# Patient Record
Sex: FEMALE | Race: WHITE | Employment: PART TIME | URBAN - METROPOLITAN AREA
[De-identification: names, ages, dates, MRNs, and addresses within clinical notes are randomized per-mention and may not be internally consistent; named-entity substitution may affect disease eponyms.]

---

## 2020-01-16 ENCOUNTER — TRANSFERRED RECORDS (OUTPATIENT)
Dept: HEALTH INFORMATION MANAGEMENT | Facility: CLINIC | Age: 25
End: 2020-01-16

## 2020-01-28 ENCOUNTER — MEDICAL CORRESPONDENCE (OUTPATIENT)
Dept: HEALTH INFORMATION MANAGEMENT | Facility: CLINIC | Age: 25
End: 2020-01-28

## 2020-01-31 ENCOUNTER — TELEPHONE (OUTPATIENT)
Dept: MATERNAL FETAL MEDICINE | Facility: CLINIC | Age: 25
End: 2020-01-31

## 2020-01-31 NOTE — TELEPHONE ENCOUNTER
Phone call to Nelida regarding referral from [facility] due to pregnancy c/b fetal heart defect. Offered visit with MFM and Peds cards at [facility] on 2/14 or 2/26. Pt declined due to work and school conflicts. Would prefer to have appt on 2/21. Writer will contact peds cards to discuss options.    Victoria Love RN

## 2020-02-03 ENCOUNTER — TELEPHONE (OUTPATIENT)
Dept: MATERNAL FETAL MEDICINE | Facility: CLINIC | Age: 25
End: 2020-02-03

## 2020-02-03 DIAGNOSIS — O35.BXX0 ANOMALY OF HEART OF FETUS AFFECTING PREGNANCY, ANTEPARTUM, SINGLE OR UNSPECIFIED FETUS: Primary | ICD-10-CM

## 2020-02-03 NOTE — TELEPHONE ENCOUNTER
Phone call to Kelsey to confirm appointment on 2/21 beginning at 1100 with L2 at MFM followed by fetal echo at 2pm at [facility]. Writer will email patient information on appts and location.    Victoria Love RN

## 2020-02-19 ENCOUNTER — PRE VISIT (OUTPATIENT)
Dept: MATERNAL FETAL MEDICINE | Facility: CLINIC | Age: 25
End: 2020-02-19

## 2020-02-21 ENCOUNTER — OFFICE VISIT (OUTPATIENT)
Dept: MATERNAL FETAL MEDICINE | Facility: CLINIC | Age: 25
End: 2020-02-21
Attending: OBSTETRICS & GYNECOLOGY
Payer: COMMERCIAL

## 2020-02-21 ENCOUNTER — HOSPITAL ENCOUNTER (OUTPATIENT)
Dept: ULTRASOUND IMAGING | Facility: CLINIC | Age: 25
Discharge: HOME | End: 2020-02-21
Attending: OBSTETRICS & GYNECOLOGY | Admitting: OBSTETRICS & GYNECOLOGY
Payer: COMMERCIAL

## 2020-02-21 ENCOUNTER — HOSPITAL ENCOUNTER (OUTPATIENT)
Dept: CARDIOLOGY | Facility: CLINIC | Age: 25
End: 2020-02-21
Payer: COMMERCIAL

## 2020-02-21 DIAGNOSIS — O40.9XX0 POLYHYDRAMNIOS, ANTEPARTUM, SINGLE OR UNSPECIFIED FETUS: ICD-10-CM

## 2020-02-21 DIAGNOSIS — O35.BXX0 ANOMALY OF HEART OF FETUS AFFECTING PREGNANCY, ANTEPARTUM, SINGLE OR UNSPECIFIED FETUS: ICD-10-CM

## 2020-02-21 DIAGNOSIS — O24.019 PRE-EXISTING TYPE 1 DIABETES MELLITUS DURING PREGNANCY, ANTEPARTUM: ICD-10-CM

## 2020-02-21 DIAGNOSIS — O35.09X0 PREGNANCY COMPLICATED BY FETAL CEREBRAL VENTRICULOMEGALY, SINGLE OR UNSPECIFIED FETUS: Primary | ICD-10-CM

## 2020-02-21 DIAGNOSIS — O26.90 PREGNANCY RELATED CONDITION, ANTEPARTUM: ICD-10-CM

## 2020-02-21 PROCEDURE — 76827 ECHO EXAM OF FETAL HEART: CPT

## 2020-02-21 PROCEDURE — 76811 OB US DETAILED SNGL FETUS: CPT

## 2020-02-21 NOTE — PROGRESS NOTES
[facility]   Heart Center Fetal Consult Note    Patient:  Kelsey Azar MRN:  [id number]   YOB: 1995 Age:  24 year old   Date of Visit:  2020 PCP:  No Ref-Primary, Physician     Dear Dr. Magana:    I had the pleasure of seeing Kelsey Azar at the [facility] on 2020 in [facility] children's fetal cardiology consultation for fetal echocardiogram. She presented today accompanied by her boyfriend, mother, and aunt. As you know, she is a 24 year old  at 27w6d who presented for fetal echocardiogram today because of suspected truncus arteriosus.    I performed and interpreted the fetal echocardiogram today, which demonstrated large ventricular septal defect. Single outlet was seen giving rise to head and neck vessles. The single outlet's valve was thickened and doming with no evidence of stenosis or insufficiency. The branch pulmonary arteries are normal in size; however, it was difficult to delinate the origin. No ductus arteriosus was identified. No obvious major aortopulmonary collaterals were seen arising from the descending aorta. Normal right and left ventricular size and systolic function. No evidence of hydrops. 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 Difficult study due to fetal position and poor imaging windows.    The family wanted very limited counseling today. With the help of a diagram, I reviewed that we were not able to delineate the branch pulmonary artery origins on this fetal echocardiogram with Kelsey Azar and her boyfriend and family. Kelsey did not want to review the physiology or plans for intervention. She would like to defer discussion with the cardiothoracic surgeon (Dr. Marie). They inquired if they chose comfort care; how long the baby would live for. I explained that it is difficult to determine  this since we are unable to see the pulmonary artery origins. The [facility] team have discussed options with the family, and currently, the Kelsey and her family are reviewing all options. I provided her with my direct contact information for additional questions after they left.   If they decide to continue with the pregnancy and further intervention, I would recommend follow up in 3 weeks in [place], [place] to assess the thickened valve (for stenosis and insufficiency), and then again at 34-35 weeks if they relocate.     Thank you for allowing me to participate in Kelsey's care. Please do not hesitate to contact me with questions or concerns.    This visit was separate from the performance and interpretation of the ultrasound. The majority of the time (>50%) was spent in counseling and coordination of care. I spent approximately 20 minutes in face-to-face time reviewing the above considerations.    Bonnie Heredia M.D.  Pediatric Cardiology  [facility] [address], 5th floor, [facility] [address]  Phone [phone]  Fax [phone]

## 2020-02-24 ENCOUNTER — TELEPHONE (OUTPATIENT)
Dept: MATERNAL FETAL MEDICINE | Facility: CLINIC | Age: 25
End: 2020-02-24

## 2020-02-24 NOTE — PROGRESS NOTES
Please see ultrasound report under imaging tab for details on ultrasound performed today.    Victoria Morales MD  , OB/GYN  Maternal-Fetal Medicine  [email or web address]  [phone] (Academic office)  [phone] (Pager)

## 2020-02-24 NOTE — TELEPHONE ENCOUNTER
24 year old   at 28w2d    Seen at [facility] for multiple fetal anomalies includin. Severe bilateral ventriculomegaly  2. Fetal congenital heart disease  3. Abnormal fetal spine/ribs/feet    As requested by Kelsey I called the [place]  clinic and spoke directly to Dr. Anthony about her, including the BPD/HC, breech, and maternal diabetes.  He is happy to help take care of her.      I called Kelsey but there was no answer.  Message left for her to call me back to discuss.    Also called Dr. Magana to give him an update on Kelsey's plan of care.  Asked him to please get an update HgbA1C to help assess her current level of control.     Plan is awaiting Kelsey's return phone call.  If she does not pursue out of state termination we will plan to see her back in early April and Dr. Magana will see her in early-mid March.            Victoria Morales MD  , OB/GYN  Maternal-Fetal Medicine  [email or web address]  [phone] (Academic office)  [phone] (Pager)

## 2020-02-24 NOTE — TELEPHONE ENCOUNTER
Spoke to Julianan, genetic counselor at  regarding potential leftover specimen from Kelsey's amniocentesis procedure completed in December. Julianna will contact [facility] to determine if there is any remaining DNA that could be used for further testing.    Per Dr. Victoria Morales's request, I spoke to the [facility] to see if Kelsey was a candidate for pregnancy termination with them. Ultrasound measurements were given and Dr. Morales discussed the case in greater detail with Dr. Reynolds. Dr. Reynolds will determine if it is most appropriate to complete a D&E procedure or a cardioplegia procedure followed by induction of labor either at [facility] or at [facility].    We will await call back from Kelsey to determine desired next steps.    Tanisha De La Cruz MS, [facility]  Maternal Fetal Medicine  [facility]  Ph: [phone]  [email or web address]

## 2020-02-25 ENCOUNTER — TELEPHONE (OUTPATIENT)
Dept: MATERNAL FETAL MEDICINE | Facility: CLINIC | Age: 25
End: 2020-02-25

## 2020-02-25 DIAGNOSIS — O35.09X0 PREGNANCY COMPLICATED BY FETAL CEREBRAL VENTRICULOMEGALY, SINGLE OR UNSPECIFIED FETUS: Primary | ICD-10-CM

## 2020-02-25 DIAGNOSIS — O35.BXX0 ANOMALY OF HEART OF FETUS AFFECTING PREGNANCY, ANTEPARTUM, SINGLE OR UNSPECIFIED FETUS: ICD-10-CM

## 2020-02-25 DIAGNOSIS — O40.9XX0 POLYHYDRAMNIOS, ANTEPARTUM, SINGLE OR UNSPECIFIED FETUS: ICD-10-CM

## 2020-02-25 NOTE — TELEPHONE ENCOUNTER
Spoke with Kelsey this morning, attempted to discuss patient wishes moving forward. Kelsye would like to wait to discuss any options until she is with her Boyfriend. She would like to make a list of questions and be prepared for the conversation. Writer DARRON. Encouraged pt to call at any time with questions or concerns and confirmed she has PCC # to call. Writer explained to pt we will help her move forward with any decision she makes. Pt VU. PT has No further questions at this time and states she will call PCC back when she is prepared to discuss options.  Luzmaria Oh RN

## 2020-02-25 NOTE — TELEPHONE ENCOUNTER
24 year old   at 28w3d    Seen at [facility] for multiple fetal anomalies includin. Severe bilateral ventriculomegaly  2. Fetal congenital heart disease  3. Abnormal fetal spine/ribs/feet    Kelsey returned my phone call and I relayed to her the information I received from Dr. Anthony at the [place]  clinic.  Discussed that he proposes to perform a cardioplegia and then likely send her back for an induction of labor here given her pre-gestational diabetes.  She has received a phone call from Dr. Magana's office to get a hemoglobin A1C done.  This information could be shared with Dr. Anthony and, depending on the level of control, he can decide if he feels comfortable completing the procedure there.  Otherwise, Kelsey understands that she would have to return and undergo IOL here.  We discussed the likely need for cranial decompression either prior to initiating IOL (cephalocentesis) and/or at the time of delivery with a skilled provider, and I have already spoken to Dr. Pelaez about this case.  Kelsey understands that although she would re-present to us after induced fetal demise, we cannot make any guarantees as to her insurance coverage for these services and as such our office will initiate an insurance investigation.      At her request, she was given the phone number to the [facility], she knows she has to call herself to make an appointment and after she has an appointment we will share her records with them.  I would be available to speak with Dr. Anthony again, as needed.   She understands they will speak directly to her about finances as well.      Kelsey then asked to review the ultrasound findings again, specifically the brain and spine findings, which we did.  She and her boyfriend Fausto, asked questions about  and classical  and the management of the pregnancy.  We reviewed that, in the absence of a lethal fetal condition, or a pregnancy complication (such as PEC or  PPROM) we could not undertake delivery early in the hopes of avoiding a classical .  They also asked some questions about post-ambar management and we reviewed the uncertainity of the prognosis given the ultrasound findings and the possible spectrum of outcomes.      At the end of the conversation Kelsey indicated that all of her questions were answered and she will call us back once she has spoken to the [facility] clinic.    After speaking with Kelsey I also did discuss this case with Dr. Donald from neonatology.  Normally they do in person consultations, but given the time and geographical constraints for Kelsey Donald was willing to speak with her by phone.  I left a message for Kelsey indicating this option, if she thinks it would be helpful for her in making this decision.      Victoria Morales MD  , OB/GYN  Maternal-Fetal Medicine  [email or web address]  [phone] (Academic office)  [phone] (Pager)

## 2020-02-26 ENCOUNTER — TELEPHONE (OUTPATIENT)
Dept: MATERNAL FETAL MEDICINE | Facility: CLINIC | Age: 25
End: 2020-02-26

## 2020-02-26 NOTE — TELEPHONE ENCOUNTER
24 year old   at 28w4d    Seen at [facility] for multiple fetal anomalies includin. Severe bilateral ventriculomegaly  2. Fetal congenital heart disease  3. Abnormal fetal spine/ribs/feet    Kelsey returned my phone call.  At this time she does not think that speaking with neonatology will be helpful.  She has already contacted the [facility] and is in the middle of financial discussions with them.              Victoria Morales MD  , OB/GYN  Maternal-Fetal Medicine  [email or web address]  [phone] (Academic office)  [phone] (Pager)

## 2020-02-28 ENCOUNTER — TELEPHONE (OUTPATIENT)
Dept: MATERNAL FETAL MEDICINE | Facility: CLINIC | Age: 25
End: 2020-02-28

## 2020-02-29 NOTE — TELEPHONE ENCOUNTER
24 year old   at 28w6d    Seen at [facility] for multiple fetal anomalies includin. Severe bilateral ventriculomegaly  2. Fetal congenital heart disease  3. Abnormal fetal spine/ribs/feet    Called Kelsey to see if she had an updates with a plan to go to [place] next week as we need to make arrangements for further care after.  She has not called them back from two days ago as she is awaiting some financial information from her mother.  I also let her know that we were in touch with the [place] clinic who quoted a 5,000 versus 10,000 fee for cardioplegia and Kelsey was given that phone number.  She was asked to please let us know as soon as arrangements were made so that we could send records.      She asked if I would speak to her mother Sheyla, also, who had some questions.  With her permission I also spoke to Sheyla.  Sheyla reports that their family/community are in the process of trying to raise funds.  She asked a few questions about the ultrasound findings, which we reviewed, including the uncertainty about the prognosis.  Sheyla asked again about the option of not pursuing  interventions at the time of birth.  I reviewed that the findings were not immediately lethal and that for a further discussion of what might be appropriate to withhold I recommend speaking with neonatology.  I informed Sheyla that Dr. Donald was willing to speak with the family, but that Kelsey had declined at this time and Sheyla notes that she supports Kelsey's decisions.  She is happy to hear they may have another option for the cardioplegia, which is less expensive.  We again reviewed that we cannot control the insurance coverage for services after the cardioplegia if the insurance company likes the out of state services to the services rendered at [facility].  Both Kelsey and Sheyla express understanding of that and have asked us not to reach out to the insurance company based on the preliminary  information they have received.    I have asked Kelsey and/or Sheyla to follow-up with us after the weekend, especially if a plan is in place for cardioplegia as we will have to make arrangements for the induction of labor.  We discussed that, ideally, we would be able to perform a cephalocentesis under anesthesia with amniocentesis to pursue further genetic testing (hydrocephalus panel, VACTERL-H testing, possible whole exom sequencing), if possible prior to starting the induction of labor.  There will absolutely need to be cranial decompression to undertake delivery and they are aware that we need time to plan this.          Victoria Morales MD  , OB/GYN  Maternal-Fetal Medicine  [email or web address]  [phone] (Academic office)  [phone] (Pager)

## 2020-03-02 ENCOUNTER — TELEPHONE (OUTPATIENT)
Dept: MATERNAL FETAL MEDICINE | Facility: CLINIC | Age: 25
End: 2020-03-02

## 2020-03-02 NOTE — TELEPHONE ENCOUNTER
ZOEY for Kelsey that we are working on her plan to set up her IOL on Thursday, Dr. Morales will call her tomorrow with details but plan to present to L&D by 0800. Instructed Kelsey to call [facility] # with any questions.  Luzmaria Oh RN

## 2020-03-02 NOTE — TELEPHONE ENCOUNTER
Per Rosy at Dr. Anthony's office, Kelsey will be scheduled for her procedure with Dr. Anthony on Wednesday, 3/4 and will be returning to [facility] on Thursday, 3/5 for amniocentesis and induction of labor.    Pertinent medical records have been sent to Dr. Anthony's office per their request.    Tanisha De La Cruz MS, [facility]  Maternal Fetal Medicine  [facility]  Ph: [phone]  [email or web address]

## 2020-03-03 ENCOUNTER — TELEPHONE (OUTPATIENT)
Dept: MATERNAL FETAL MEDICINE | Facility: CLINIC | Age: 25
End: 2020-03-03

## 2020-03-03 NOTE — TELEPHONE ENCOUNTER
Email with address to [facility] sent to Kelsey with arrival time at 0630 on Thursday 3/5.      Victoria Love RN

## 2020-03-03 NOTE — TELEPHONE ENCOUNTER
24 year old   at 29w3d    Seen at [facility] for multiple fetal anomalies includin. Severe bilateral ventriculomegaly  2. Fetal congenital heart disease  3. Abnormal fetal spine/ribs/feet     Kelsey is at the airport right now waiting for her flight to [place].      I spoke to Dr. Anthony and Dr. Main at the [place]  clinic; they do not perform ECV.      Kelsey is flying back to [place] tomorrow night and is staying with her aunt.    Reviewed with her the [facility]2020 to get admitted.  Directions sent to her e-mail for where to present that morning.  Will plan for mifepristone on admission.  Reviewed the plan for amniocentesis and hopefully cephalocentesis prior to starting IOL.  Discussed plan for IOL including the process of cervical ripening followed by oxytocin.  Discussed possibility of slow delivery of head/head entrapment with the possible need for further cranial decompression.  Discussed the last resort of Duhrssen incisions.  Discussed that we would evaluate everything Thursday morning and make the final plan at that time.      Kelsey had no questions.        Victoria Morales MD  , OB/GYN  Maternal-Fetal Medicine  [email or web address]  [phone] (Academic office)  [phone] (Pager)

## 2020-03-04 ENCOUNTER — DOCUMENTATION ONLY (OUTPATIENT)
Dept: MATERNAL FETAL MEDICINE | Facility: CLINIC | Age: 25
End: 2020-03-04

## 2020-03-04 NOTE — PROGRESS NOTES
Per Dr. Victoria Morales's recommendation, out-of-pocket cost for the Hydrocephalus Panel via [employer] [facility] is being assessed. Per [employer], assistance funds may be available to put towards the cost of Kelsey's testing. This possibility is being assessed by [employer] today and and we will be informed of any discount available. A benefits investigation was also submitted to [employer] today to clarify maximum out-of-pocket cost for this testing.  The Hydrocephalus Panel is test code 7939 and cell culture is test code 995.    A second option for testing would be Targeted Prenatal Exome via [facility] laboratory. Per [facility], extracted DNA would be necessary to perform this testing as well as blood specimens from both mom and dad to complete a trio analysis. The [facility] laboratory has been contacted to determine if DNA extraction from an amniocentesis specimen would be possible.    A genetic counselor will consult Kelsey during her induction tomorrow to complete necessary consents and discuss this testing, cost, and possible turn around time.    Tanisha De La Cruz MS, [facility]  Maternal Fetal Medicine  [facility]  Ph: [phone]  [email or web address]

## 2020-03-05 ENCOUNTER — ANESTHESIA (OUTPATIENT)
Dept: OBGYN | Facility: CLINIC | Age: 25
End: 2020-03-05
Payer: COMMERCIAL

## 2020-03-05 ENCOUNTER — HOSPITAL ENCOUNTER (INPATIENT)
Facility: CLINIC | Age: 25
LOS: 4 days | Discharge: HOME | End: 2020-03-09
Attending: OBSTETRICS & GYNECOLOGY | Admitting: OBSTETRICS & GYNECOLOGY
Payer: COMMERCIAL

## 2020-03-05 ENCOUNTER — ANESTHESIA EVENT (OUTPATIENT)
Dept: OBGYN | Facility: CLINIC | Age: 25
End: 2020-03-05
Payer: COMMERCIAL

## 2020-03-05 ENCOUNTER — HOSPITAL ENCOUNTER (INPATIENT)
Dept: ULTRASOUND IMAGING | Facility: CLINIC | Age: 25
End: 2020-03-05
Attending: OBSTETRICS & GYNECOLOGY
Payer: COMMERCIAL

## 2020-03-05 ENCOUNTER — TRANSFERRED RECORDS (OUTPATIENT)
Dept: HEALTH INFORMATION MANAGEMENT | Facility: CLINIC | Age: 25
End: 2020-03-05

## 2020-03-05 PROBLEM — O36.4XX0 FETAL DEMISE, GREATER THAN 22 WEEKS, ANTEPARTUM: Status: ACTIVE | Noted: 2020-03-05

## 2020-03-05 LAB
ABO + RH BLD: NORMAL
ABO + RH BLD: NORMAL
BASOPHILS # BLD AUTO: 0 10E9/L (ref 0–0.2)
BASOPHILS NFR BLD AUTO: 0.2 %
BLD GP AB SCN SERPL QL: NORMAL
BLOOD BANK CMNT PATIENT-IMP: NORMAL
DIFFERENTIAL METHOD BLD: ABNORMAL
EOSINOPHIL # BLD AUTO: 0 10E9/L (ref 0–0.7)
EOSINOPHIL NFR BLD AUTO: 0.3 %
ERYTHROCYTE [DISTWIDTH] IN BLOOD BY AUTOMATED COUNT: 12.5 % (ref 10–15)
GLUCOSE BLDC GLUCOMTR-MCNC: 101 MG/DL (ref 70–99)
GLUCOSE BLDC GLUCOMTR-MCNC: 122 MG/DL (ref 70–99)
GLUCOSE BLDC GLUCOMTR-MCNC: 189 MG/DL (ref 70–99)
GLUCOSE BLDC GLUCOMTR-MCNC: 198 MG/DL (ref 70–99)
GLUCOSE BLDC GLUCOMTR-MCNC: 236 MG/DL (ref 70–99)
GLUCOSE BLDC GLUCOMTR-MCNC: 272 MG/DL (ref 70–99)
GLUCOSE BLDC GLUCOMTR-MCNC: 304 MG/DL (ref 70–99)
GLUCOSE BLDC GLUCOMTR-MCNC: 342 MG/DL (ref 70–99)
HCT VFR BLD AUTO: 36.7 % (ref 35–47)
HGB BLD-MCNC: 12.5 G/DL (ref 11.7–15.7)
HIV1+2 AB SPEC QL IA.RAPID: NONREACTIVE
IMM GRANULOCYTES # BLD: 0 10E9/L (ref 0–0.4)
IMM GRANULOCYTES NFR BLD: 0.3 %
LYMPHOCYTES # BLD AUTO: 1.4 10E9/L (ref 0.8–5.3)
LYMPHOCYTES NFR BLD AUTO: 10.4 %
MCH RBC QN AUTO: 31.9 PG (ref 26.5–33)
MCHC RBC AUTO-ENTMCNC: 34.1 G/DL (ref 31.5–36.5)
MCV RBC AUTO: 94 FL (ref 78–100)
MISCELLANEOUS TEST: NORMAL
MONOCYTES # BLD AUTO: 0.5 10E9/L (ref 0–1.3)
MONOCYTES NFR BLD AUTO: 4 %
NEUTROPHILS # BLD AUTO: 11.3 10E9/L (ref 1.6–8.3)
NEUTROPHILS NFR BLD AUTO: 84.8 %
NRBC # BLD AUTO: 0 10*3/UL
NRBC BLD AUTO-RTO: 0 /100
PLATELET # BLD AUTO: 236 10E9/L (ref 150–450)
RBC # BLD AUTO: 3.92 10E12/L (ref 3.8–5.2)
SPECIMEN EXP DATE BLD: NORMAL
WBC # BLD AUTO: 13.3 10E9/L (ref 4–11)

## 2020-03-05 PROCEDURE — 00000146 ZZHCL STATISTIC GLUCOSE BY METER IP

## 2020-03-05 PROCEDURE — 25000132 ZZH RX MED GY IP 250 OP 250 PS 637: Performed by: STUDENT IN AN ORGANIZED HEALTH CARE EDUCATION/TRAINING PROGRAM

## 2020-03-05 PROCEDURE — 3E0P7VZ INTRODUCTION OF HORMONE INTO FEMALE REPRODUCTIVE, VIA NATURAL OR ARTIFICIAL OPENING: ICD-10-PCS | Performed by: OBSTETRICS & GYNECOLOGY

## 2020-03-05 PROCEDURE — 81321 PTEN GENE FULL SEQUENCE: CPT | Performed by: OBSTETRICS & GYNECOLOGY

## 2020-03-05 PROCEDURE — 12000001 ZZH R&B MED SURG/OB UMMC

## 2020-03-05 PROCEDURE — 36415 COLL VENOUS BLD VENIPUNCTURE: CPT | Performed by: OBSTETRICS & GYNECOLOGY

## 2020-03-05 PROCEDURE — 25000125 ZZHC RX 250: Performed by: OBSTETRICS & GYNECOLOGY

## 2020-03-05 PROCEDURE — 86703 HIV-1/HIV-2 1 RESULT ANTBDY: CPT | Performed by: OBSTETRICS & GYNECOLOGY

## 2020-03-05 PROCEDURE — 85025 COMPLETE CBC W/AUTO DIFF WBC: CPT | Performed by: STUDENT IN AN ORGANIZED HEALTH CARE EDUCATION/TRAINING PROGRAM

## 2020-03-05 PROCEDURE — 3E033VJ INTRODUCTION OF OTHER HORMONE INTO PERIPHERAL VEIN, PERCUTANEOUS APPROACH: ICD-10-PCS | Performed by: OBSTETRICS & GYNECOLOGY

## 2020-03-05 PROCEDURE — 86901 BLOOD TYPING SEROLOGIC RH(D): CPT | Performed by: STUDENT IN AN ORGANIZED HEALTH CARE EDUCATION/TRAINING PROGRAM

## 2020-03-05 PROCEDURE — 25800030 ZZH RX IP 258 OP 636: Performed by: STUDENT IN AN ORGANIZED HEALTH CARE EDUCATION/TRAINING PROGRAM

## 2020-03-05 PROCEDURE — 10903ZU DRAINAGE OF AMNIOTIC FLUID, DIAGNOSTIC FROM PRODUCTS OF CONCEPTION, PERCUTANEOUS APPROACH: ICD-10-PCS | Performed by: OBSTETRICS & GYNECOLOGY

## 2020-03-05 PROCEDURE — 81407 MOPATH PROCEDURE LEVEL 8: CPT | Performed by: OBSTETRICS & GYNECOLOGY

## 2020-03-05 PROCEDURE — 40001082 ZZHCL STATISTIC MATERNAL CELL CONTAM MOLEC: Performed by: OBSTETRICS & GYNECOLOGY

## 2020-03-05 PROCEDURE — 81479 UNLISTED MOLECULAR PATHOLOGY: CPT | Performed by: OBSTETRICS & GYNECOLOGY

## 2020-03-05 PROCEDURE — 81406 MOPATH PROCEDURE LEVEL 7: CPT | Performed by: OBSTETRICS & GYNECOLOGY

## 2020-03-05 PROCEDURE — 59000 AMNIOCENTESIS DIAGNOSTIC: CPT

## 2020-03-05 PROCEDURE — 84999 UNLISTED CHEMISTRY PROCEDURE: CPT | Performed by: OBSTETRICS & GYNECOLOGY

## 2020-03-05 PROCEDURE — 86803 HEPATITIS C AB TEST: CPT | Performed by: OBSTETRICS & GYNECOLOGY

## 2020-03-05 PROCEDURE — 86900 BLOOD TYPING SEROLOGIC ABO: CPT | Performed by: STUDENT IN AN ORGANIZED HEALTH CARE EDUCATION/TRAINING PROGRAM

## 2020-03-05 PROCEDURE — 25000131 ZZH RX MED GY IP 250 OP 636 PS 637: Performed by: PHYSICIAN ASSISTANT

## 2020-03-05 PROCEDURE — 25000131 ZZH RX MED GY IP 250 OP 636 PS 637: Performed by: STUDENT IN AN ORGANIZED HEALTH CARE EDUCATION/TRAINING PROGRAM

## 2020-03-05 PROCEDURE — 86850 RBC ANTIBODY SCREEN: CPT | Performed by: STUDENT IN AN ORGANIZED HEALTH CARE EDUCATION/TRAINING PROGRAM

## 2020-03-05 PROCEDURE — 81323 PTEN GENE DUP/DELET VARIANT: CPT | Performed by: OBSTETRICS & GYNECOLOGY

## 2020-03-05 RX ORDER — DIPHENOXYLATE HCL/ATROPINE 2.5-.025MG
2 TABLET ORAL ONCE
Status: DISCONTINUED | OUTPATIENT
Start: 2020-03-05 | End: 2020-03-08

## 2020-03-05 RX ORDER — MISOPROSTOL 100 UG/1
25 TABLET ORAL EVERY 4 HOURS PRN
Status: COMPLETED | OUTPATIENT
Start: 2020-03-05 | End: 2020-03-06

## 2020-03-05 RX ORDER — ACETAMINOPHEN 325 MG/1
975 TABLET ORAL EVERY 4 HOURS PRN
Status: DISCONTINUED | OUTPATIENT
Start: 2020-03-05 | End: 2020-03-08

## 2020-03-05 RX ORDER — ACETAMINOPHEN 325 MG/1
650 TABLET ORAL EVERY 4 HOURS PRN
Status: DISCONTINUED | OUTPATIENT
Start: 2020-03-05 | End: 2020-03-08

## 2020-03-05 RX ORDER — LIDOCAINE HYDROCHLORIDE 10 MG/ML
10 INJECTION, SOLUTION EPIDURAL; INFILTRATION; INTRACAUDAL; PERINEURAL ONCE
Status: COMPLETED | OUTPATIENT
Start: 2020-03-05 | End: 2020-03-05

## 2020-03-05 RX ORDER — ONDANSETRON 2 MG/ML
4 INJECTION INTRAMUSCULAR; INTRAVENOUS EVERY 6 HOURS PRN
Status: DISCONTINUED | OUTPATIENT
Start: 2020-03-05 | End: 2020-03-08

## 2020-03-05 RX ORDER — DEXTROSE MONOHYDRATE 25 G/50ML
25-50 INJECTION, SOLUTION INTRAVENOUS
Status: DISCONTINUED | OUTPATIENT
Start: 2020-03-05 | End: 2020-03-09 | Stop reason: HOSPADM

## 2020-03-05 RX ORDER — NICOTINE POLACRILEX 4 MG
15-30 LOZENGE BUCCAL
Status: DISCONTINUED | OUTPATIENT
Start: 2020-03-05 | End: 2020-03-09 | Stop reason: HOSPADM

## 2020-03-05 RX ORDER — MIFEPRISTONE 200 MG/1
200 TABLET ORAL ONCE
Status: COMPLETED | OUTPATIENT
Start: 2020-03-05 | End: 2020-03-05

## 2020-03-05 RX ORDER — TERBUTALINE SULFATE 1 MG/ML
0.25 INJECTION, SOLUTION SUBCUTANEOUS
Status: DISCONTINUED | OUTPATIENT
Start: 2020-03-05 | End: 2020-03-08

## 2020-03-05 RX ORDER — DIPHENHYDRAMINE HYDROCHLORIDE 50 MG/ML
25 INJECTION INTRAMUSCULAR; INTRAVENOUS EVERY 6 HOURS PRN
Status: DISCONTINUED | OUTPATIENT
Start: 2020-03-05 | End: 2020-03-08

## 2020-03-05 RX ORDER — DIPHENHYDRAMINE HCL 25 MG
25 CAPSULE ORAL EVERY 6 HOURS PRN
Status: DISCONTINUED | OUTPATIENT
Start: 2020-03-05 | End: 2020-03-08

## 2020-03-05 RX ORDER — ASPIRIN 81 MG/1
81 TABLET, CHEWABLE ORAL DAILY
COMMUNITY

## 2020-03-05 RX ORDER — SODIUM CHLORIDE, SODIUM LACTATE, POTASSIUM CHLORIDE, CALCIUM CHLORIDE 600; 310; 30; 20 MG/100ML; MG/100ML; MG/100ML; MG/100ML
INJECTION, SOLUTION INTRAVENOUS CONTINUOUS
Status: DISCONTINUED | OUTPATIENT
Start: 2020-03-05 | End: 2020-03-08

## 2020-03-05 RX ORDER — LIDOCAINE 40 MG/G
CREAM TOPICAL
Status: DISCONTINUED | OUTPATIENT
Start: 2020-03-05 | End: 2020-03-08

## 2020-03-05 RX ORDER — CHLORAL HYDRATE 500 MG
2 CAPSULE ORAL DAILY
COMMUNITY

## 2020-03-05 RX ORDER — DIPHENOXYLATE HCL/ATROPINE 2.5-.025MG
2 TABLET ORAL EVERY 4 HOURS PRN
Status: DISCONTINUED | OUTPATIENT
Start: 2020-03-05 | End: 2020-03-08

## 2020-03-05 RX ORDER — INSULIN LISPRO 100 [IU]/ML
INJECTION, SOLUTION INTRAVENOUS; SUBCUTANEOUS
COMMUNITY

## 2020-03-05 RX ADMIN — SODIUM CHLORIDE, POTASSIUM CHLORIDE, SODIUM LACTATE AND CALCIUM CHLORIDE: 600; 310; 30; 20 INJECTION, SOLUTION INTRAVENOUS at 01:31

## 2020-03-05 RX ADMIN — Medication 25 MCG: at 21:00

## 2020-03-05 RX ADMIN — INSULIN DETEMIR 24 UNITS: 100 INJECTION, SOLUTION SUBCUTANEOUS at 22:10

## 2020-03-05 RX ADMIN — Medication 25 MCG: at 13:06

## 2020-03-05 RX ADMIN — Medication 200 MG: at 14:42

## 2020-03-05 RX ADMIN — ACETAMINOPHEN 975 MG: 325 TABLET, FILM COATED ORAL at 09:42

## 2020-03-05 RX ADMIN — Medication 25 MCG: at 16:52

## 2020-03-05 RX ADMIN — LIDOCAINE HYDROCHLORIDE 2 ML: 10 INJECTION, SOLUTION EPIDURAL; INFILTRATION; INTRACAUDAL; PERINEURAL at 07:40

## 2020-03-05 RX ADMIN — DIPHENHYDRAMINE HYDROCHLORIDE 25 MG: 25 CAPSULE ORAL at 01:32

## 2020-03-05 RX ADMIN — DIPHENHYDRAMINE HYDROCHLORIDE 25 MG: 25 CAPSULE ORAL at 22:10

## 2020-03-05 RX ADMIN — SODIUM CHLORIDE, POTASSIUM CHLORIDE, SODIUM LACTATE AND CALCIUM CHLORIDE 1000 ML: 600; 310; 30; 20 INJECTION, SOLUTION INTRAVENOUS at 09:44

## 2020-03-05 RX ADMIN — INSULIN ASPART 2 UNITS: 100 INJECTION, SOLUTION INTRAVENOUS; SUBCUTANEOUS at 03:36

## 2020-03-05 SDOH — HEALTH STABILITY: MENTAL HEALTH: HOW OFTEN DO YOU HAVE A DRINK CONTAINING ALCOHOL?: NEVER

## 2020-03-05 ASSESSMENT — MIFFLIN-ST. JEOR: SCORE: 1565.54

## 2020-03-05 NOTE — PLAN OF CARE
Pt arrived to unit approximately at 0030 with her boyfriend and mother. Pt being admitted due to induced fetal demise for multiple fetal anomalies. Pt was in [place] for administration of cardioplegia and just landed around 2300 prior to coming to hospital. Pt stated while in [place] she was experiencing a low grade fever of approx 101, was jessie and had blood in her urine. Upon arrival pt did not have a fever, denies any chills, blood in urine, loss of fluid or vaginal bleeding. Pt does report mild cramping, but is coping well. She is very exhausted and is hoping to get some rest prior to procedure in AM. Pt was given benadryl PO to help her sleep. Plan is for pt to rest, remain NPO, IV fluids infusing at 125 mL/hr and to continue to monitor pts blood sugar closely. All questions and concerns have currently been addressed. Will continue to monitor pt.

## 2020-03-05 NOTE — PROVIDER NOTIFICATION
03/05/20 0055   Provider Notification   Provider Name/Title Dr. Samuels   Method of Notification At Bedside   Notification Reason Patient Arrived;Status Update   Provider at bedside introducing self to pt. Plan is to collect blood for lab work, start an IV and infuse fluids at 125 mL/hr. Pt to remain NPO until procedure in AM. Provider also ordered SVDs due to pt recent travel on plane and bilateral leg swelling. All of patient and patient's family questions and concerns addressed.

## 2020-03-05 NOTE — PROVIDER NOTIFICATION
03/05/20 0331   Provider Notification   Provider Name/Title Dr. Samuels   Method of Notification Phone   Notification Reason Other (Comment)  (high blood sugar)   Spoke with provider on the phone of pts blood sugar of 236. Plan is to give 2 units subcutaneous of NovoLog based off of the sliding scale. Per provider, recheck blood sugar before pt eats breakfast after procedure.

## 2020-03-05 NOTE — PROVIDER NOTIFICATION
03/05/20 0120   Provider Notification   Provider Name/Title Dr. Samuels   Method of Notification Phone   Notification Reason Other (Comment)  (high blood sugar)   Provider notified of pts high blood sugar of 342. Pt stated that she feels that when she was in the hospital in [place] that they were not covering her carbs correctly. Pt stated she took her blood sugar prior to arrival and corrected her high blood sugar with her short acting. Plan is to reassess blood sugar at around 0330, if need be, there is an order set in for insulin. Not correcting high blood sugar at this time, so high blood sugar is not over corrected. Will continue to monitor pt closely.

## 2020-03-05 NOTE — PLAN OF CARE
Pt had amnio and cephalocentesis this morning. 1200ml of fluid drained from head. Pt tolerated procedure ok. Pt c/o discomfort of abdomen after procedure. Tylenol and hot packs given for pain. Misoprostil 25mcg placed vaginally at 1305. Pt states pain is much better. Castor applied for contractions. Pt denies any currently.  in room to discuss plan with pt. Continue to closely monitor.

## 2020-03-05 NOTE — PROGRESS NOTES
[facility]  Labor Progress Note    Subjective:  Patient comfortable, aware of pain mgmt options when needed.    Objective:   Vitals:    20 0339 20 0935 20 1330 20 1657   BP: 101/57 112/60 110/58 126/81   Pulse: 60      Resp: 16 18 18 16   Temp: 98.2  F (36.8  C) 97.9  F (36.6  C) 98.3  F (36.8  C) 98.3  F (36.8  C)   TempSrc: Oral Oral Oral Oral   Weight:       Height:         SVE: Closed, long, high, miso placed  Membranes: intact  Presentation: breech by prior US, placenta anterior    Assessment/Plan:  Ms. Kelsey Azar is a 24 year old  at 29w5d with fetal demise following cardioplegia for multiple fetal anomalies. Patient s/p amniocentesis and cephalocentesis this a.m. Fetal head at estimated 32w size    # Induction of Labor   - Mifepristone given 1446. Plan for PV misoprostol at 25mcg q4 hours s/p 2 doses. Consider bray balloon when cervix amenable.    - Discussed pain relief options including epidural and IV medications. Patient undecided at this time.    - Fen/GI: consistent carb diet     #Type I DM   -PTA Insulin continued w/ PRN TIFFANIE. Levemir 24u qhs, Humalog 1U:8CHO    -at bedtime, QAC, and 2 hour PP BG   -Do not need GTT for elevated BG prior to delivery given fetal demise.       Beata Cevallos MD  PGY-4 OB/GYN  [phone] (OB G3 Pager)

## 2020-03-05 NOTE — PROGRESS NOTES
"[facility]  Labor Progress Note    Subjective:  Patient resting in bed s/p cephalocentesis this morning. .    Objective:   Vitals:    20 0040 20 0339   BP: 119/69 101/57   Pulse: 60 60   Resp: 16 16   Temp: 97.9  F (36.6  C) 98.2  F (36.8  C)   TempSrc: Oral Oral   Weight: 79.4 kg (175 lb)    Height: 1.676 m (5' 6\")        SVE: Deferred   Membranes: intact  Presentation: breech by US, placenta anterior    Assessment/Plan:  Ms. Kelsey Azar is a 24 year old  at 29w5d with fetal demise following cardioplegia for multiple fetal anomalies. Patient s/p amniocentesis and cephalocentesis this a.m. Fetal head at estimated 32w size    # Induction of Labor   - Mifepristone ordered. Plan for PV misoprostol at 25mcg q4 hours. Consider bray balloon when cervix amenable.    - Discussed pain relief options including epidural and IV medications. Patient undecided at this time.    - Fen/GI: consistent carb diet     #Type I DM   -PTA Insulin continued w/ PRN TIFFANIE. Levemir 24u qhs, Humalog 1U:8CHO    -at bedtime, QAC, and 2 hour PP BG   -Do not need GTT for elevated BG prior to delivery given fetal demise.       Discussed with Dr. Terrell Alicia Myhre, DO  OB/GYN Resident, PGY-3  3/5/2020 12:09 PM    "

## 2020-03-05 NOTE — DISCHARGE SUMMARY
[facility] Discharge Summary    Kelsey Azar MRN# [id number]   Age: 24 year old YOB: 1995     Date of Admission:  3/5/2020  Date of Discharge:  20  Admitting Physician:  Laurie Chao MD  Discharge Physician:  Victoria Morales MD    Admit Dx:   - Intrauterine pregnancy at 29w5d, fetal demise at 29w4d  - Type I diabetes mellitus   - Fetal anomalies:    -Severe fetal bilateral ventriculomegaly, no visible normal brain parenchyma   - Fetal congenital heart defect with a single outflow tract and arch noted, large VSD.    - Abnormal fetal spine/ribs/feet     Discharge Dx:  - Same as above, s/p     Procedures:  - cephalocentesis  - induction of labor  - epidural anesthesia  - spontaneous vaginal delivery    Consults:  - Endocrinology    Admit HPI/Labor Course:  Kelsey Azar is a 24 year old  at 29w5d by 5w5d US who was admitted following induced fetal demise in setting of multiple fetal anomalies. Patient had cardioplegia in [place] on 3/4/20. Reports mild cramping, but she was able to sleep through it on plane. She denies any other abdominal pain, loss of fluid, or vaginal bleeding.      Please see her Admission H&P and Delivery Summary for further details.    She underwent cephalocentesis for 1200 ml of fluid and amniocentesis prior to induction.  She received 200 mg mifepristone on admission followed by misoprostol 25 mcg PV x 6 doses for induction without much change in her cervix.  She then rested overnight on HD#2.  She then received an epidural and resumed IOL with misoprostol 100 mcg PV x 4 doses.  She was noted to be complete at 0640.  She had a fever during labor thought to be related to misoprostol/epidural, but did receive ampicillin and gentamycin.       SROM for clear fluid occurred just before she started pushing.  She delivered a stillborn female infant breech.  No heart tones or spontaneous movement after delivery.  Weight pending. The  cord was double clamped and cut.  The infant was taken to the warmer and wrapped and then returned to the patient.      The placenta was then delivered using gentle traction and fundal massage and appeared to be intact with 2 VC.  The uterus was noted to be firm after fundal massage.  The perineum was assessed for lacerations and none were noted.    mL.  The patient declined autopsy.    Postpartum Course:  Antibiotics were discontinued upon delivery and she remained afebrile.  Her postpartum course was uncomplicated. On PPD#1, she was meeting all of her postpartum goals and deemed stable for discharge. She was voiding without difficulty, tolerating a regular diet without nausea and vomiting, her pain was well controlled on oral pain medicines and her lochia was appropriate.  She declined prescriptions for ibuprofen and acetaminophen, opting to do them over the counter.      Discharge Medications:  Review of your medicines         CONTINUE these medicines which have NOT CHANGED      Dose / Directions   fish oil-omega-3 fatty acids 1000 MG capsule      Dose:  2 g  Take 2 g by mouth daily  Refills:  0     Insulin aspart    Inject Subcutaneous 3 times daily (before meals), 1U/12 grams of carbs along with correction  Refills:  0     insulin detemir 100 UNIT/ML pen  Commonly known as:  LEVEMIR PEN  Indication:  Insulin-Dependent Diabetes      Dose:  12 Units  Inject 12 Units Subcutaneous At Bedtime  Refills:  0     PRENATAL VITAMIN PO      Refills:  0       Discharge/Disposition:  Kelsey Azar was discharged to home in stable condition with the following instructions/medications:  1) Call for temperature > 100.4, bright red vaginal bleeding >1 pad an hour x 2 hours, foul smelling vaginal discharge, pain not controlled by usual oral pain meds, persistent nausea and vomiting not controlled on medications  2) She was instructed to follow-up with her primary OB in 1 week for a mood check and in 6 weeks for a  routine postpartum visit.  3) She was instructed to follow-up with her endocrinologist in 1-2 weeks.      Alicia Myhre, DO  Obstetrics and Gynecology, PGY-3  March 9, 2020, 8:38 AM    MFM Attending Addendum    I, Victoria Morales MD, saw and evaluated this patient prior to discharge.  I have discussed the care of Ms. Azar with the resident and agree with the plan of care as documented above.      I personally reviewed vital signs, medications, labs and imaging.      I personally spent a total of 30 minutes with Kelsey Azar on discharge activities including calling guidelines.  Appropriate follow-up in place.    Date of service (when I saw the patient): March 9, 2020      Victoria Morales MD  , OB/GYN  Maternal-Fetal Medicine  [email or web address]  [phone] (Academic office)  [phone] (Pager)

## 2020-03-05 NOTE — PROGRESS NOTES
"Received order for RUI to see for support related to her fetal demise.      Met with Kelsey this afternoon.  SHARON and her mom had left to go shower at Kelsey's aunts' home in [place], [place].  Kelsey is tearful as we talked and is feeling emotionally and physically depleted from the stress of this experience.      Kelsey is 24 years-old.  FOB is Fausto Siddiqui.  They have been together for a little over one year.  Kelsey states he is helpful and supportive.  They live together in a town near [place], [place]. This is their first baby.  They know this baby is a girl and have named her \"Anna\"-- named after Kelsey's grandmother.    Kelsey identifies good support from her family.  All are aware of Kelsey's complicated pregnancy journey and her induction of labor today.      Shared information with Kelsey about Now I Lay Me Down To Sleep. She is not sure if she would like photos but will talk with Fausto about this and will let nursing staff know.  The [facility] , Sandra Leroy, can be reached at [phone].      Kelsey and Fausto plan cremation for Anna. They have not yet chosen a  home for these arrangements.  Discussed option of choosing a  home near to where they live or one here in the [facility].  Kelsey will discuss this with Fausto and let me know how I can help facilitate these arrangements.      Kelsey has a special outfit, hat and blanket for Anna.  Her mother will bring these things to the hospital when she returns this afternoon.  Kelsey and Fausto would like all available mementos including hand mold, and hand/foot prints.      RUI provided supportive counseling related to Kelsey's fears - She is scared about how Anna will look after birth.   Offered reassurance that physicians and nursing staff can see baby after birth and then tell her what they see before handing Anna to her.      Will follow up with family when they are ready to " talk again.

## 2020-03-05 NOTE — CONSULTS
[facility]  Genetic Counseling Consult    Patient: Kelsey Azar YOB: 1995   Date of Service: 3/05/20      Kelsey Azar was seen as a part of her admission by genetic counseling to discuss and coordinate genetic testing for her pregnancy.  Also present were Kelsey's partner Fausto and her mother Sheyla.         Impression/Plan:   1.  Kelsey had an ultrasound and amniocentesis. The following testing was ordered on the prenatal sample: Hydrocephalus Gene Panel through [facility].  Results are expected within 3-4 weeks, and will be available in [facility].  We will contact her to discuss the results, and a copy will be forwarded to the office of the referring OB provider.      2.  Appropriate consent was obtained for genetic testing and collected specimen was sent to [facility] for testing.  Kelsey's case has already been discussed with [facility] and they are able to lower the cost of testing for her to approximately $390.  Kelsey was encouraged to contact genetic counseling with any concerns during the billing process.        Risk Assessment:     Terezas pregnancy was complicated by multiple significant anomalies documented in her EMR.  Previous genetic testing includes a normal female microarray, and these records were available for review today.  We discussed that additional testing via an expanded gene panel can provide additional information and potentially identify an underlying cause for the differences affecting her pregnancy.  We briefly reviewed that genes are the regions of our DNA that contain coded instructions, and that errors within these genes can cause genetic disease.  We discussed that some genetic errors occur as a completely random or sporadic occurrence in a single individual, and sometimes individuals inherit copies of genetic mutations from both parents, resulting in disease.  We discussed that panel testing is a  way of assessing the genetic code for a number of genes that are all known to be associated with specific health concerns.  We discussed that there is a panel test available that looks at 17 genes associated with hydrocephalus, and Kelsey opted to pursue this testing at this time.      We reviewed the possible results that can come back from this type of testing, positive, negative, and variants of uncertain significance, as well as the implications for each type of result.  Kelsey had very few questions at this time and demonstrated understanding of all topics discussed.  We did discuss that there is broader testing available, called whole exome sequencing, which is a test that assesses the DNA coding regions for ALL of an individual's genes, including genes that are not specifically associated with the health problems affecting the pregnancy.  We discussed that this type of testing is significantly more likely to result in uncertain results, but may also assess for possible explanations not included on panel testing.  Kelsey and her mother expressed significant concerns regarding the costs of testing, and we discussed that the panel testing discussed would likely be the cheaper testing option by thousands of dollars.  Ultimately Kelsey opted to pursue the hydrocephalus gene panel discussed today.  Appropriate consent was obtained and Kelsey will be contacted to discuss results when available.    Hydrocephalus Gene Panel: AKT3, AP1S2, PNSP74U, CCND2, CRB2, DNAI1, EML1, FLVCR2, HDAC6, L1CAM, MPDZ, P4HB, PIK3R2, POMT1, PTEN, WDR81, ZIC3     It was a pleasure to be involved with Kelsey s care. Face-to-face time of the meeting was 25 minutes.      Walter Vivas MS, [facility]  Licensed Genetic Counselor  Phone: [phone]  Pager: [phone]

## 2020-03-05 NOTE — PLAN OF CARE
Pt was able to get some rest before procedure this AM. Pt denies any cramping, vaginal bleeding, leaking of fluids or discharge. Pt is a type 1 diabetic, at 0330 pts blood sugar was 236 and was corrected with 2 units of novoLog. Pt remains NPO until procedure. Dr. Morales is currently at bedside going over consent forms and procedure information with pt, significant other and patients mother. All questions and concerns were addressed. Report given to KIMBERLY Patiño.

## 2020-03-05 NOTE — H&P
Antepartum History and Physical   2020  Kelsey Azar  [id number]      HPI: Kelsey Azar is a 24 year old  at 29w5d by 5w5d US who following induced fetal demise in setting of multiple fetal anomalies outlined below. Patient is to undergo amniocentesis as well as cephalocentesis prior to induction of labor. Patient had cardioplegia in [place] on 3/4/20, approximately 45 minutes following procedure she had fever to ~101. Since that time denies any further, fever, chills or abdominal pain. Reports mild cramping, but she was able to sleep through it on plane. She denies any other abdominal pain, loss of fluid, or vaginal bleeding. She is very tired, hoping to get some rest tonight.     In regards to type I diabetes patient is on regimen outlined below. Last took levemir earlier this evening. She checked BS and it was 300s, corrected with sliding scale one hour ago.     ROS:  No headaches, vision changes, nausea, vomiting, chest pain, SOB,  constipation, diarrhea, dysuria, changes in vaginal discharge or edema in extremities noted.     Her pregnancy has been complicated by:  - Severe fetal bilateral ventriculomegaly, no visible normal brain parenchyma  - Fetal congenital heart defect with a single outflow tract and arch noted, large VSD.   - Abnormal fetal spine/ribs/feet   - Possible fetal TE fistula or esophageal atresia  - Type I diabetes mellitus     OBHX:   OB History    Para Term  AB Living   1 0 0 0 0 0   SAB TAB Ectopic Multiple Live Births   0 0 0 0 0      # Outcome Date GA Lbr Franco/2nd Weight Sex Delivery Anes PTL Lv   1 Current              MedicalHX:   Past Medical History:   Diagnosis Date     Diabetes (H)     type 1 diabetic     SurgicalHX:   Anita teeth     Medications:   Levemir 24 units bedtime  Humalog 1 units: 8 CHO  Sliding scale 1 unit/50 >150   PNV  Fish oil  ASA 81 mg     Allergies:  Allergies   Allergen Reactions     Bactrim [Sulfamethoxazole  "W/Trimethoprim] Rash     FamilyHX:  No family history on file.    SocialHX:   Denies any tobacco, alcohol, or illicit drug use in pregnancy     ROS: 10-point ROS negative except as indicated in HPI.    Physical Exam:  Vitals:    03/05/20 0040   BP: 119/69   Pulse: 60   Resp: 16   Temp: 97.9  F (36.6  C)   TempSrc: Oral   Weight: 79.4 kg (175 lb)   Height: 1.676 m (5' 6\")     General: alert, oriented female, resting in bed in NAD  CV: regular rate and rhythm, normal s1 and s2, no murmurs  Lungs: clear bilaterally, no crackles or wheezes  Abdomen: soft, gravid, non-tender  Extremities: bilateral lower extremities non-tender with +1 edema    Labs:   CBC, T&S, POC glucose    Imaging:   [facility] Comprehensive US 2/21/20:   1) Abbasi intrauterine pregnancy at 27 & 6/7 weeks gestational age.  2) There are multiple severe fetal anomalies:  - The intracranial anatomy is very abnormal. There is severe bilateral ventriculomegaly, the third ventricle is dilated. There is no visible normal brain parenchyma. 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 The posterior fossa is obliterated. The head circumference is >99th percentile measuring 10 weeks ahead.  - The heart is abnormal with a single outflow tract and arch noted. There is a large ventricular septal defect. The single outlet valve appears thickened. There is left axis deviation.  - Mild frontal bossing.  - The orbits appear abnormally close set, but this may be due to the abnormally large head size.  - The spine is abnormal with abnormal vertebral bodies, mostly in the lower thoracic/upper lumbar region; some appear to be hemivertebrae and some appear to have extra disorganized bone in small clusters. The overlying skin appears intact, but cannot rule out a neural tube defect.  - The ribs are abnormally short and straight and while some are angled cephalad some are angled caudal. The thoracic circumference is normal.  - There is bilateral pre-axial polydactyl with a large extra digit " coming off the mid-foot, it does not appear to contain bone.  - The stomach is somewhat small  - There is a single umbilical artery  - Cannot rule out a horseshoe kidney.  3) Estimated fetal weight was consistent with established dates (with head parameters omitted).  4) There is mild polyhydramnios, which may be due to underlying diabetes, but when associated with a small stomach may also mean a GI abnormality, such as TE fistula or esophageal atresia.  5) Normal fetal activity for gestational age.  6) On transabdominal imaging the cervix appears long and closed.    Assessment: 24 year old  at 29w5d by 5w5d US who following induced fetal demise in setting of multiple fetal anomalies including severe fetal bilateral ventriculomegaly, fetal congenital heart disease, and abnormal fetal spine/ribs/feet. S/p cardioplegia in [place] on 3/4/20. Patient is to undergo amniocentesis as well as cephalocentesis prior to induction of labor.     Plan:  # Induction following fetal demise  - Admit to labor and delivery  - Plan for amniocentesis as well as cephalocentesis in morning prior to induction of labor. Amniocentesis will be done for genetic testing in setting of multiple fetal anomalies. Cephalocentesis is necessary for cranial decompression given significant ventriculomegaly. Discussed possibility of slow delivery of head/head entrapment with possible need for further cranial decompression.   - Genetic counselor consult tomorrow   - Febrile x1 following procedure, afebrile on admission. No signs or symptoms of infection at this time. Will continue to monitor closely.   - Will review induction process in further detail tomorrow    # Type I Diabetes mellitus   - Prior to admission medications ordered: Levemir 24 units bedtime, Humalog 1 units: 8 CHO, Sliding scale 1 unit/50 >150   - In early labor while patient is eating will check BS QID AC, when no longer eating in labor will check Q4H, and in active labor Q2H. Will  start insulin drip if indicated.   - Glucose on arrival 342, patient reports taking insulin to cover this just prior to arrival. Will recheck in two hours, if still elevated will treat with sliding scale insulin       Care plan discussed under supervision of Dr. Chao .    Diane Samuels MD  Obstetrics and Gyncology, PGY-3  2020 , 1:22 AM      Physician Attestation   I, Laurie Chao MD, saw this patient with the resident and agree with the resident/fellow's findings and plan of care as documented in the note.      I personally reviewed vital signs, medications, labs and imaging.    Key findings: In summary, Kelsey Azar is a  at 29w4d admitted for evaluation for possible fever prior to proceeding with planned cephalocentesis and induction of labor.  No clinical evidence suggestive of infection and therefore will continue close monitoring and plan cephalocentesis prior to IOL.  Patient is in agreement with this plan of care.     Laurie Chao MD  Date of Service (when I saw the patient): 20    Time Spent on this Encounter   I spent 30 minutes on the unit/floor managing the care of Kelsey Azar. Over 50% of my time was spent on the following:   - Counseling the patient and/or family regarding: diagnostic results, prognosis, risks and benefits of treatment options and prevention of disease  - Coordination of care with the: nurse, patient and family    In summary, Kelsey Azar is a  at 29w4d admitted for evaluation for possible fever prior to proceeding with planned cephalocentesis and induction of labor.  No clinical evidence suggestive of infection and therefore will continue close monitoring and plan cephalocentesis prior to IOL.  Patient is in agreement with this plan of care.     Laurie Chao MD

## 2020-03-06 ENCOUNTER — TELEPHONE (OUTPATIENT)
Dept: MATERNAL FETAL MEDICINE | Facility: CLINIC | Age: 25
End: 2020-03-06

## 2020-03-06 PROBLEM — O24.012 TYPE 1 DIABETES MELLITUS DURING PREGNANCY IN SECOND TRIMESTER: Status: ACTIVE | Noted: 2020-03-06

## 2020-03-06 LAB
ERYTHROCYTE [DISTWIDTH] IN BLOOD BY AUTOMATED COUNT: 12 % (ref 10–15)
GLUCOSE BLDC GLUCOMTR-MCNC: 162 MG/DL (ref 70–99)
GLUCOSE BLDC GLUCOMTR-MCNC: 182 MG/DL (ref 70–99)
GLUCOSE BLDC GLUCOMTR-MCNC: 215 MG/DL (ref 70–99)
GLUCOSE BLDC GLUCOMTR-MCNC: 217 MG/DL (ref 70–99)
GLUCOSE BLDC GLUCOMTR-MCNC: 98 MG/DL (ref 70–99)
HCT VFR BLD AUTO: 36.8 % (ref 35–47)
HGB BLD-MCNC: 12.7 G/DL (ref 11.7–15.7)
MCH RBC QN AUTO: 31.1 PG (ref 26.5–33)
MCHC RBC AUTO-ENTMCNC: 34.5 G/DL (ref 31.5–36.5)
MCV RBC AUTO: 90 FL (ref 78–100)
PLATELET # BLD AUTO: 247 10E9/L (ref 150–450)
RBC # BLD AUTO: 4.09 10E12/L (ref 3.8–5.2)
WBC # BLD AUTO: 13.1 10E9/L (ref 4–11)

## 2020-03-06 PROCEDURE — 36415 COLL VENOUS BLD VENIPUNCTURE: CPT | Performed by: STUDENT IN AN ORGANIZED HEALTH CARE EDUCATION/TRAINING PROGRAM

## 2020-03-06 PROCEDURE — 25000132 ZZH RX MED GY IP 250 OP 250 PS 637: Performed by: STUDENT IN AN ORGANIZED HEALTH CARE EDUCATION/TRAINING PROGRAM

## 2020-03-06 PROCEDURE — 25000128 H RX IP 250 OP 636: Performed by: STUDENT IN AN ORGANIZED HEALTH CARE EDUCATION/TRAINING PROGRAM

## 2020-03-06 PROCEDURE — 85027 COMPLETE CBC AUTOMATED: CPT | Performed by: STUDENT IN AN ORGANIZED HEALTH CARE EDUCATION/TRAINING PROGRAM

## 2020-03-06 PROCEDURE — 12000001 ZZH R&B MED SURG/OB UMMC

## 2020-03-06 PROCEDURE — 00000146 ZZHCL STATISTIC GLUCOSE BY METER IP

## 2020-03-06 RX ORDER — DEXTROSE, SODIUM CHLORIDE, SODIUM LACTATE, POTASSIUM CHLORIDE, AND CALCIUM CHLORIDE 5; .6; .31; .03; .02 G/100ML; G/100ML; G/100ML; G/100ML; G/100ML
INJECTION, SOLUTION INTRAVENOUS CONTINUOUS PRN
Status: DISCONTINUED | OUTPATIENT
Start: 2020-03-06 | End: 2020-03-08

## 2020-03-06 RX ORDER — CLINDAMYCIN PHOSPHATE 900 MG/50ML
900 INJECTION, SOLUTION INTRAVENOUS
Status: DISCONTINUED | OUTPATIENT
Start: 2020-03-06 | End: 2020-03-08

## 2020-03-06 RX ORDER — OXYTOCIN 10 [USP'U]/ML
10 INJECTION, SOLUTION INTRAMUSCULAR; INTRAVENOUS
Status: DISCONTINUED | OUTPATIENT
Start: 2020-03-06 | End: 2020-03-08

## 2020-03-06 RX ORDER — PROCHLORPERAZINE 25 MG
25 SUPPOSITORY, RECTAL RECTAL EVERY 12 HOURS PRN
Status: DISCONTINUED | OUTPATIENT
Start: 2020-03-06 | End: 2020-03-08

## 2020-03-06 RX ORDER — MISOPROSTOL 100 UG/1
25 TABLET ORAL EVERY 4 HOURS PRN
Status: DISCONTINUED | OUTPATIENT
Start: 2020-03-06 | End: 2020-03-07

## 2020-03-06 RX ORDER — ACETAMINOPHEN 325 MG/1
650 TABLET ORAL EVERY 4 HOURS PRN
Status: DISCONTINUED | OUTPATIENT
Start: 2020-03-06 | End: 2020-03-08

## 2020-03-06 RX ORDER — METHYLERGONOVINE MALEATE 0.2 MG/ML
200 INJECTION INTRAVENOUS
Status: DISCONTINUED | OUTPATIENT
Start: 2020-03-06 | End: 2020-03-08

## 2020-03-06 RX ORDER — NALOXONE HYDROCHLORIDE 0.4 MG/ML
.1-.4 INJECTION, SOLUTION INTRAMUSCULAR; INTRAVENOUS; SUBCUTANEOUS
Status: DISCONTINUED | OUTPATIENT
Start: 2020-03-06 | End: 2020-03-07

## 2020-03-06 RX ORDER — CARBOPROST TROMETHAMINE 250 UG/ML
250 INJECTION, SOLUTION INTRAMUSCULAR
Status: DISCONTINUED | OUTPATIENT
Start: 2020-03-06 | End: 2020-03-08

## 2020-03-06 RX ORDER — CITRIC ACID/SODIUM CITRATE 334-500MG
30 SOLUTION, ORAL ORAL
Status: DISCONTINUED | OUTPATIENT
Start: 2020-03-06 | End: 2020-03-08

## 2020-03-06 RX ORDER — ZOLPIDEM TARTRATE 5 MG/1
5 TABLET ORAL
Status: DISCONTINUED | OUTPATIENT
Start: 2020-03-06 | End: 2020-03-09 | Stop reason: HOSPADM

## 2020-03-06 RX ORDER — IBUPROFEN 200 MG
600 TABLET ORAL EVERY 6 HOURS PRN
Status: DISCONTINUED | OUTPATIENT
Start: 2020-03-06 | End: 2020-03-09 | Stop reason: HOSPADM

## 2020-03-06 RX ORDER — SODIUM CHLORIDE, SODIUM LACTATE, POTASSIUM CHLORIDE, CALCIUM CHLORIDE 600; 310; 30; 20 MG/100ML; MG/100ML; MG/100ML; MG/100ML
INJECTION, SOLUTION INTRAVENOUS CONTINUOUS
Status: DISCONTINUED | OUTPATIENT
Start: 2020-03-06 | End: 2020-03-08

## 2020-03-06 RX ORDER — ONDANSETRON 2 MG/ML
4 INJECTION INTRAMUSCULAR; INTRAVENOUS EVERY 6 HOURS PRN
Status: DISCONTINUED | OUTPATIENT
Start: 2020-03-06 | End: 2020-03-08

## 2020-03-06 RX ORDER — HYDROXYZINE HYDROCHLORIDE 50 MG/1
50-100 TABLET, FILM COATED ORAL
Status: COMPLETED | OUTPATIENT
Start: 2020-03-06 | End: 2020-03-06

## 2020-03-06 RX ORDER — IBUPROFEN 800 MG/1
800 TABLET, FILM COATED ORAL
Status: COMPLETED | OUTPATIENT
Start: 2020-03-06 | End: 2020-03-08

## 2020-03-06 RX ORDER — FENTANYL CITRATE 50 UG/ML
50-100 INJECTION, SOLUTION INTRAMUSCULAR; INTRAVENOUS
Status: DISCONTINUED | OUTPATIENT
Start: 2020-03-06 | End: 2020-03-08

## 2020-03-06 RX ORDER — OXYCODONE AND ACETAMINOPHEN 5; 325 MG/1; MG/1
1 TABLET ORAL
Status: DISCONTINUED | OUTPATIENT
Start: 2020-03-06 | End: 2020-03-08

## 2020-03-06 RX ORDER — MORPHINE SULFATE 10 MG/ML
10 INJECTION, SOLUTION INTRAMUSCULAR; INTRAVENOUS
Status: COMPLETED | OUTPATIENT
Start: 2020-03-06 | End: 2020-03-06

## 2020-03-06 RX ORDER — OXYTOCIN/0.9 % SODIUM CHLORIDE 30/500 ML
100-340 PLASTIC BAG, INJECTION (ML) INTRAVENOUS CONTINUOUS PRN
Status: COMPLETED | OUTPATIENT
Start: 2020-03-06 | End: 2020-03-08

## 2020-03-06 RX ADMIN — Medication 25 MCG: at 05:01

## 2020-03-06 RX ADMIN — ACETAMINOPHEN 975 MG: 325 TABLET, FILM COATED ORAL at 10:12

## 2020-03-06 RX ADMIN — Medication 25 MCG: at 10:20

## 2020-03-06 RX ADMIN — Medication 25 MCG: at 00:58

## 2020-03-06 RX ADMIN — HYDROXYZINE HYDROCHLORIDE 100 MG: 50 TABLET, FILM COATED ORAL at 22:49

## 2020-03-06 RX ADMIN — MORPHINE SULFATE 10 MG: 10 INJECTION INTRAVENOUS at 22:50

## 2020-03-06 RX ADMIN — IBUPROFEN 600 MG: 200 TABLET, FILM COATED ORAL at 14:19

## 2020-03-06 RX ADMIN — FENTANYL CITRATE 50 MCG: 50 INJECTION, SOLUTION INTRAMUSCULAR; INTRAVENOUS at 15:07

## 2020-03-06 RX ADMIN — Medication 25 MCG: at 14:26

## 2020-03-06 RX ADMIN — ACETAMINOPHEN 975 MG: 325 TABLET, FILM COATED ORAL at 05:22

## 2020-03-06 NOTE — PROGRESS NOTES
[facility]  Labor Progress Note    Subjective:  Patient feeling mild cramps, overall comfortable     Objective:   Vitals:    20 0935 20 1330 20 1657 20 2100   BP: 112/60 110/58 126/81 114/68   Pulse:       Resp: 18 18 16 16   Temp: 97.9  F (36.6  C) 98.3  F (36.8  C) 98.3  F (36.8  C) 98.7  F (37.1  C)   TempSrc: Oral Oral Oral Oral   Weight:       Height:         SVE: Deferred   Membranes: intact  Presentation: breech by prior US, placenta anterior    Assessment/Plan:  Ms. Kelsey Azar is a 24 year old  at 29w5d with fetal demise following cardioplegia for multiple fetal anomalies. Patient s/p amniocentesis and cephalocentesis this a.m. Fetal head at estimated 32w size    # Induction of Labor  - Mifepristone (1442) > VA Miso 25mcg VA x3 (). Last cervical check C/L/H (1700). Consider bray balloon when cervix amenable.   - Discussed pain relief options including epidural and IV medications. Patient undecided at this time. Considering morphine and vistaril overnight   - Fen/GI: consistent carb diet     #Type I DM  - PTA Insulin continued w/ PRN TIFFANIE. Levemir 24u qhs, Humalog 1U:8CHO   - at bedtime, QAC, and 2 hour PP BG  - Do not need insulin gtt for elevated BG prior to delivery given fetal demise.       Diane Samuels MD   OB/GYN Resident PGY-3  3/5/2020 11:16 PM

## 2020-03-06 NOTE — PROGRESS NOTES
"S:  No concerns, resting.  Took benadryl last night for sleep-will want something for sleep again tonight.    O: /68   Pulse 60   Temp 98.7  F (37.1  C) (Oral)   Resp 16   Ht 1.676 m (5' 6\")   Wt 79.4 kg (175 lb)   LMP  (LMP Unknown)   BMI 28.25 kg/m    gen-pleasant, comfortable    cx-exam not repeated    A:  23 y/o  at 29+5 weeks admitted for IOL following cardioplegia for fetal anomalies, cephalocentesis for fetal head decompression    P:  Continue cervical ripening as planned.    Chelsea Aaron MD, FACOG    "

## 2020-03-06 NOTE — PLAN OF CARE
VSS. IOL continued, on vaginal miso x3. SVE at 1700 closed/long/high. Occasional ctx traced on TOCO, pt feeling some tightening. Palpate mild.  loss packet and some mementos provided; pt has no questions/concerns at this time. Partner and family supportive at bedside. Continue to monitor closely and follow current plan of care.

## 2020-03-06 NOTE — ANESTHESIA PREPROCEDURE EVALUATION
"Anesthesia Pre-Procedure Evaluation    Patient: Kelsey Azar   MRN:     [id number] Gender:   female   Age:    24 year old :      1995        Preoperative Diagnosis: * No surgery found *        LABS:  CBC:   Lab Results   Component Value Date    WBC 13.3 (H) 2020    HGB 12.5 2020    HCT 36.7 2020     2020     BMP: No results found for: NA, POTASSIUM, CHLORIDE, CO2, BUN, CR, GLC  COAGS: No results found for: PTT, INR, FIBR  POC:   Lab Results   Component Value Date     (H) 2020     OTHER: No results found for: PH, LACT, A1C, DAMARIS, PHOS, MAG, ALBUMIN, PROTTOTAL, ALT, AST, GGT, ALKPHOS, BILITOTAL, BILIDIRECT, LIPASE, AMYLASE, BHUMIKA, TSH, T4, T3, CRP, SED     Preop Vitals    BP Readings from Last 3 Encounters:   20 126/81    Pulse Readings from Last 3 Encounters:   20 60      Resp Readings from Last 3 Encounters:   20 16    SpO2 Readings from Last 3 Encounters:   No data found for SpO2      Temp Readings from Last 1 Encounters:   20 36.8  C (98.3  F) (Oral)    Ht Readings from Last 1 Encounters:   20 1.676 m (5' 6\")      Wt Readings from Last 1 Encounters:   20 79.4 kg (175 lb)    Estimated body mass index is 28.25 kg/m  as calculated from the following:    Height as of this encounter: 1.676 m (5' 6\").    Weight as of this encounter: 79.4 kg (175 lb).     LDA:  Peripheral IV 20 Right Hand (Active)   Site Assessment WDL 3/5/2020  3:45 PM   Line Status Saline locked 3/5/2020  3:45 PM   Phlebitis Scale 0-->no symptoms 3/5/2020  3:45 PM   Infiltration Scale 0 3/5/2020  3:45 PM   Number of days: 0        Past Medical History:   Diagnosis Date     Diabetes (H)     type 1 diabetic      No past surgical history on file.   Allergies   Allergen Reactions     Bactrim [Sulfamethoxazole W/Trimethoprim] Rash     Cefixime Rash        Anesthesia Evaluation     .             ROS/MED HX    ENT/Pulmonary:  - neg pulmonary ROS   "   Neurologic:  - neg neurologic ROS     Cardiovascular:  - neg cardiovascular ROS       METS/Exercise Tolerance:     Hematologic:         Musculoskeletal:         GI/Hepatic:  - neg GI/hepatic ROS       Renal/Genitourinary:         Endo:     (+) type I DM, .      Psychiatric:         Infectious Disease:         Malignancy:         Other: Comment: 24 year old  at 29w5d with fetal demise following cardioplegia for multiple fetal anomalies.                          PHYSICAL EXAM:   Mental Status/Neuro:    Airway: Facies: Feasible   Respiratory: Auscultation: CTAB     Resp. Rate: Normal     Resp. Effort: Normal      CV: Rhythm: Regular  Rate: Age appropriate  Heart: Normal Sounds  Edema: None   Comments:                      Assessment:   ASA SCORE: 2    H&P: History and physical reviewed and following examination; no interval change.   Smoking Status:  Non-Smoker/Unknown   NPO Status: NPO Appropriate     Plan:   Anes. Type:  Epidural     Epidural Details:  Catheter; Lumbar   Pre-Medication: None   Induction:  N/a   Airway: Native Airway   Access/Monitoring: PIV   Maintenance: N/a     Postop Plan:   Postop Pain: Regional  Postop Sedation/Airway: Not planned  Disposition: Inpatient/Admit     PONV Management:  NO PONV Prophylaxis Required     CONSENT: Direct conversation   Plan and risks discussed with: Patient                      Trae Aragon MD

## 2020-03-06 NOTE — PROVIDER NOTIFICATION
03/06/20 0500   Provider Notification   Provider Name/Title Dr. Samuels   Method of Notification At Bedside   Notification Reason Status Update;SVE   Provider at bedside, SVE 0/50/-1, vag miso inserted(#5). Provider spoke with pt about possible bray balloon insertion, pt currently declines unless it is really necessary. Pt complaining of mild cramping and discomfort, 975mg tylenol given and hot packs applied to lower abdomen. Pt also requested rapid acting insulin to be given, pt blood sugar was 162, one unit of novolog was given. Will continue to monitor and update provider with any changes.    regular

## 2020-03-06 NOTE — PROGRESS NOTES
[facility]  Labor Progress Note    Subjective:  Patient is overall comfortable, but beginning to feel more cramping    Objective:   Vitals:    20 1657 20 2100 20 0055 20 0509   BP: 126/81 114/68 108/67 103/58   Pulse:       Resp: 16 16 16 16   Temp: 98.3  F (36.8  C) 98.7  F (37.1  C) 98.6  F (37  C) 98.3  F (36.8  C)   TempSrc: Oral Oral Oral Oral   Weight:       Height:         SVE: deferred  Membranes: intact  Presentation: breech by prior US, placenta anterior    Assessment/Plan:  Ms. Kelsey Azar is a 24 year old  at 29w6d with fetal demise following cardioplegia for multiple fetal anomalies. Patient s/p amniocentesis and cephalocentesis this a.m. Fetal head at estimated 32w size    # Induction of Labor  - Mifepristone (1442) > VA Miso 25mcg VA x 6 (1012). Last cervical check C/50/-2. Consider bray balloon when cervix amenable, if possible patient would like to avoid placement of bray. Will check cervix again at 1500  - Discussed pain relief options during bray placement. Patient desires fentanyl for placement.   - Fen/GI: consistent carb diet     #Type I DM  - PTA Insulin continued w/ PRN TIFFANIE. Levemir 24u qhs, Humalog 1U:8CHO   - at bedtime, QAC, and 2 hour PP BG  - Do not need insulin gtt for elevated BG prior to delivery given fetal demise.     Alicia Myhre,   Obstetrics and Gyncology, PGY-3  2020 , 1:39 PM

## 2020-03-06 NOTE — PROVIDER NOTIFICATION
03/06/20 0043   Provider Notification   Provider Name/Title Dr. Samuels   Method of Notification Phone   Notification Reason Uterine Activity   Spoke to provider about placement of 4th vag miso and reviewed contraction pattern with provider. Plan is to continue with IOL of vaginal misos and provider will perform a cervical exam around 0500 to determine if it is appropriate to place a bray balloon. Pt is currently comfortable at this time, complaining of mild tightness. Will continue to monitor and update provider with any changes.

## 2020-03-06 NOTE — TELEPHONE ENCOUNTER
3/6/2020    Called and left a VM with the following information:  [employer] provided an updated cost estimate for the Hydrocephalus Panel discussed and ordered yesterday, and the expected cost of testing for Kelsey is $190.  Documentation of this estimate from [employer] will be sent to be scanned into Kelsey's EMR. Kelsey was encouraged to contact me with any questions or concerns regarding this information.     Walter Vivas MS, [facility]  Licensed Genetic Counselor  Phone: [phone]  Pager: [phone]

## 2020-03-06 NOTE — PROGRESS NOTES
[facility]  Labor Progress Note    Subjective:  Patient is feeling increased cramps. She is tearful and states that she just wants to be done.     Objective:   Vitals:    20 2100 20 0055 20 0509 20 1420   BP: 114/68 108/67 103/58 110/68   Pulse:       Resp: 16 16 16    Temp: 98.7  F (37.1  C) 98.6  F (37  C) 98.3  F (36.8  C) 98.4  F (36.9  C)   TempSrc: Oral Oral Oral Oral   Weight:       Height:         SVE: Closed/50/0  Membranes: intact  Presentation: breech by prior US, placenta anterior    Assessment/Plan:  Ms. Kelsey Azar is a 24 year old  at 29w6d with fetal demise following cardioplegia for multiple fetal anomalies. Patient s/p amniocentesis and cephalocentesis this a.m. Fetal head at estimated 32w size    # Induction of Labor  - Mifepristone (1711) > VA Miso 25mcg VA x 7 (9205). Last cervical check C/50/-2. Consider bray balloon when cervix amenable   - Discussed pain relief options during bray placement. Patient desires fentanyl for placement.   - Fen/GI: consistent carb diet  - Discussed option of  section to expedite delivery. Reviewed recovery and lifting/driving restrictions. Patient undecided and would like to think more about her options.      #Type I DM  - PTA Insulin continued w/ PRN TIFFANIE. Levemir 24u qhs, Humalog 1U:8CHO   - at bedtime, QAC, and 2 hour PP BG  - Do not need insulin gtt for elevated BG prior to delivery given fetal demise.     Alicia Myhre, DO  Obstetrics and Gyncology, PGY-3  2020, 3:30 PM

## 2020-03-06 NOTE — PLAN OF CARE
Pt. Is taking tylenol and motrin for her contraction pain that she calls cramping.  She has had a lot of family here visiting.  She is exhausted from the trip to [place] and back, along with the hospital stay.  Pt. And family given support and education

## 2020-03-06 NOTE — PROGRESS NOTES
Dr. Myhre here to attempt to place a bray balloon, but unable.  Pt. Is tearful as she is wanting this to be done.   section discussed as an option and education.She is grieving appropriately and asking questions. She choose to proceed with vaginal delivery at this time.

## 2020-03-06 NOTE — PROGRESS NOTES
Met with Kelsey and Fausto again this afternoon to offer support.     Kelsey and Fausto have decided they would like support from a .  They will let nursing staff know when they would like to have the on-call  paged.      They have decided they do want photos with [facility].  SW contacted the [facility]  Sandra Mariaa [phone] and provided preliminary information.  Nursing staff to call back when family is ready for photos to see if there is a volunteer  available.      Discussed family's plan for cremation for Anna.  Fausto's brother passed away in recent months and his family worked with [facility]  Home in [place], [phone].  Per family's request,  I contacted this  home to inquire about fees for direct cremation.  Awaiting a call back from a .  This information will help family decide between the [facility]  home in [place], [place] or the [employer] [place] here in the [facility].      Kelsey is tearful and exhausted.  She is needing this process to be over but is ambivalent about  delivery and how this may impact her work and school in the weeks ahead.      SW left pregnancy and infant loss resources in the room but have not reviewed them yet with family.  Will do so when family is ready for this information.     The on-call SW can be paged([phone])  after delivery if/when family needs assistance and support.  Otherwise,  I will follow up with them on Monday morning.

## 2020-03-06 NOTE — PLAN OF CARE
Pt was able to sleep well throughout the night, experiencing mild cramping/tightness. Pt denies any leaking of fluid, bleeding or vaginal discharge. Plan is to continue with IOL vaginal miso, pt has currently received a total of 5. SVE at 0500 0/50/-1 with unknown presenting parts per provider. Occasional contractions traced on toco. Pt has no questions or concerns at this time. Consent has been signed for epidural. Pt stated that she will let nursing staff know when she would like an epidural. Continue to monitor closely and follow the current plan of care.

## 2020-03-06 NOTE — PROGRESS NOTES
[facility]  Labor Progress Note    Subjective:  Patient is overall comfortable, able to get some sleep.     Objective:   Vitals:    20 1657 20 2100 20 0055 20 0509   BP: 126/81 114/68 108/67 103/58   Pulse:       Resp: 16 16 16 16   Temp: 98.3  F (36.8  C) 98.7  F (37.1  C) 98.6  F (37  C) 98.3  F (36.8  C)   TempSrc: Oral Oral Oral Oral   Weight:       Height:         SVE: C/50/-2  Membranes: intact  Presentation: breech by prior US, placenta anterior    Assessment/Plan:  Ms. Kelsey Azar is a 24 year old  at 29w6d with fetal demise following cardioplegia for multiple fetal anomalies. Patient s/p amniocentesis and cephalocentesis this a.m. Fetal head at estimated 32w size    # Induction of Labor  - Mifepristone (1442) > VA Miso 25mcg VA x5 (0500). Last cervical check C/50/-2. Consider bray balloon when cervix amenable, if possible patient would like to avoid placement of bray   - Discussed pain relief options including epidural and IV medications. Patient plans for epidural eventually, has been consented by anesthesia   - Fen/GI: consistent carb diet     #Type I DM  - PTA Insulin continued w/ PRN TIFFANIE. Levemir 24u qhs, Humalog 1U:8CHO   - at bedtime, QAC, and 2 hour PP BG  - Do not need insulin gtt for elevated BG prior to delivery given fetal demise.     Diane Samuels MD   OB/GYN Resident PGY-3  3/6/2020 5:19 AM

## 2020-03-06 NOTE — CONSULTS
Diabetes/Hyperglycemia Management Consult    Chief Complaint type 1 diabetes in for IOL (fetal demise)  Consult requested by: Dr. Laurie Chao  History of Present Illness Ms. Kelsey Azar is a 25 yo woman,  at 29w6d of pregnancy with fetal demise following cardioplegia for multiple fetal anomalies, type 1 diabetes without complication, who was admitted 3/5/2020 for induction of labor.    Prior to admission Albaro was taking Levemir 24 units nightly, Humalog 1 unit per 8 g of carbohydrate, Humalog 1 unit/50 greater than 100 mg/DL.  She was checking fasting blood sugars and pre-meal blood sugars (no postprandial glucoses).  Her fasting blood sugars were ranging 80s to 90s most of the time, pre-meal blood sugars 90s to 150s.  Her hemoglobin A1c had improved from 9.4% in 2019, down to 6.8% this 2020.  Prior to pregnancy she was taking Basaglar 12 units nightly, and NovoLog 1 unit per 12 g, 1 unit per 50 for BG greater than 100 mg/DL.  Her insulin requirements jumped up rapidly during the pregnancy, which is likely due to increased insulin resistance but could also been due to insufficient insulin doses pre-pregnancy.    On admission patient's blood sugar was in the mid 200s but rapidly came down to 101 with minimal NovoLog.  She was started on her home insulin pregnancy regimen and glucoses have been ranging 90s to low 300s.  Yesterday her glucoses were trending up during the day, this is again occurring today, likely indicating that her NovoLog doses are insufficient.  Overnight glucoses steadily came down, was 98 by midmorning.  She has received vaginal misoprostol x7, the OB team is considering a Webb balloon and cervix is amenable.   section is still an option which patient is considering to expedite delivery, but still going forth currently with plan for vaginal delivery.    When I visited with Kelsey midday she was having mild contractions but noted that the frequency had  increased.  She denied having any loss of fluids.  Her appetite is less than normal, but she is still eating.  She is planning on getting an epidural as labor progresses.    Recent Labs   Lab 03/06/20  1428 03/06/20  1019 03/06/20  0517 03/06/20  0137 03/05/20  2208 03/05/20 2022   * 98 162* 215* 272* 304*         Diabetes Type:   Type 1 Diabetes  Diabetes Duration: x 5 years  Usual Diabetes Regimen:   During Pregnancy  Levemir 24 units nightly  Humalog 1 unit per 8 g carbohydrate  Humalog 1 unit per 50 for blood sugar greater than 100  Checking glucose fasting and pre-meal    Pre-pregnancy  Basaglar 12 units nightly  Admelog 1 unit per 12 g carbohydrate  Admelog 1 unit per 50 for blood sugar greater than 100    Ability to [place] Prescribed Regimen: improved during pregnancy, given huge reduction in A1c.    Diabetes Control: Per [facility]: 9.4% in Sept 2019, 6.8% in 2/20/2020  Diabetes Complications: none  Able to Detect Hypoglycemia: Yes, with BG <70s (occurring a couple times per week lately- no pattern to occurrence).  Usual Diabetes Care Provider: Kendy Luna NP at [facility] in [place]  Factors Impacting Glucose Control: pregnancy, labor, decreased po intake.      Review of Systems  10 point ROS completed with pertinent positives and negatives noted in the HPI    Past medical, family and social histories are reviewed and updated.    Past Medical History  Past Medical History:   Diagnosis Date     Diabetes (H)     type 1 diabetic       Family History  No family history of diabetes    Social History  Social History     Socioeconomic History     Marital status: Single     Spouse name: None     Number of children: None     Years of education: None     Highest education level: None   Occupational History     None   Social Needs     Financial resource strain: None     Food insecurity:     Worry: None     Inability: None     Transportation needs:     Medical: None     Non-medical:  "None   Tobacco Use     Smoking status: Never Smoker   Substance and Sexual Activity     Alcohol use: Never     Frequency: Never     Drug use: Never     Sexual activity: None   Lifestyle     Physical activity:     Days per week: None     Minutes per session: None     Stress: None   Relationships     Social connections:     Talks on phone: None     Gets together: None     Attends Buddhism service: None     Active member of club or organization: None     Attends meetings of clubs or organizations: None     Relationship status: None     Intimate partner violence:     Fear of current or ex partner: None     Emotionally abused: None     Physically abused: None     Forced sexual activity: None   Other Topics Concern     None   Social History Narrative     None   Starla lives in [facility] ([address] [facility] [address] [address] [facility]  She is currently working at the [employer] in [place], plans to return to [employer] soon.      Physical Exam  /68   Pulse 60   Temp 98.4  F (36.9  C) (Oral)   Resp 16   Ht 1.676 m (5' 6\")   Wt 79.4 kg (175 lb)   LMP  (LMP Unknown)   BMI 28.25 kg/m      General:  pleasant young woman, resting in bed, in no distress.Pt's boyfriend and his sister, along with her 2 brothers, are present.  HEENT: NC/AT, PER and anicteric, non-injected, oral mucous membranes moist.   Lungs: unlabored respiration, no cough  Skin: warm and dry, no obvious lesions  MSK:  fluid movement of all extremities  Mental status:  alert, oriented x3, communicating clearly  Psych:  calm, even mood    Laboratory  No results for input(s): NA, POTASSIUM, CHLORIDE, CO2, ANIONGAP, GLC, BUN, CR, DAMARIS in the last 08108 hours.  CBC RESULTS:   Recent Labs   Lab Test 03/05/20  0125   WBC 13.3*   RBC 3.92   HGB 12.5   HCT 36.7   MCV 94   MCH 31.9   MCHC 34.1   RDW 12.5          Liver Function Studies - No results for input(s): PROTTOTAL, ALBUMIN, BILITOTAL, ALKPHOS, AST, ALT, BILIDIRECT in the last 74980 hours.    Active " "Medications  Current Facility-Administered Medications   Medication     acetaminophen (TYLENOL) tablet 650 mg     acetaminophen (TYLENOL) tablet 650 mg     acetaminophen (TYLENOL) tablet 975 mg     carboprost (HEMABATE) injection 250 mcg     dextrose 5% in lactated ringers infusion     glucose gel 15-30 g    Or     dextrose 50 % injection 25-50 mL    Or     glucagon injection 1 mg     diphenhydrAMINE (BENADRYL) capsule 25 mg    Or     diphenhydrAMINE (BENADRYL) injection 25 mg     diphenoxylate-atropine (LOMOTIL) 2.5-0.025 MG per tablet 2 tablet    Followed by     diphenoxylate-atropine (LOMOTIL) 2.5-0.025 MG per tablet 2 tablet     fentaNYL (PF) (SUBLIMAZE) injection  mcg     ibuprofen (ADVIL/MOTRIN) tablet 600 mg     ibuprofen (ADVIL/MOTRIN) tablet 800 mg     insulin aspart (NovoLOG) injection (RAPID ACTING)     insulin aspart (NovoLOG) injection (RAPID ACTING)     insulin aspart (NovoLOG) injection (RAPID ACTING)     insulin aspart (NovoLOG) injection (RAPID ACTING)     insulin aspart (NovoLOG) injection (RAPID ACTING)     insulin aspart (NovoLOG) injection (RAPID ACTING)     insulin detemir (LEVEMIR PEN) injection 18 Units     lactated ringers infusion     lidocaine (LMX4) cream     lidocaine 1 % 0.1-20 mL     lidocaine 1 % 1 mL     Medication Instructions - cervical ripening and induction medications     Medication Instructions - cervical ripening and induction medications     Medication Instructions: misoprostol (CYTOTEC)- Nurse to discuss ordering with provider, if needed. Ordered via \"OB misoprostol (CYTOTEC) Postpartum Hemorrhage PANEL\"     methylergonovine (METHERGINE) injection 200 mcg     misoprostol (cervical ripening) (CYTOTEC) quarter-tab 25 mcg     naloxone (NARCAN) injection 0.1-0.4 mg     nitrous oxide/oxygen 50/50 blend     No Tdap Needed - Assessment: Patient does not need Tdap vaccine     ondansetron (ZOFRAN) injection 4 mg     ondansetron (ZOFRAN) injection 4 mg     ondansetron (ZOFRAN) " injection 4 mg     oxyCODONE-acetaminophen (PERCOCET) 5-325 MG per tablet 1 tablet     oxytocin (PITOCIN) 30 units in 500 mL 0.9% NaCl infusion     oxytocin (PITOCIN) injection 10 Units     prochlorperazine (COMPAZINE) injection 10 mg    Or     prochlorperazine (COMPAZINE) Suppository 25 mg     sodium chloride (PF) 0.9% PF flush 3 mL     sodium chloride (PF) 0.9% PF flush 3 mL     terbutaline (BRETHINE) injection 0.25 mg     tranexamic acid (CYKLOKAPRON) bolus 1 g vial attach to NaCl 50 or 100 mL bag ADULT     Use OB Cervical Ripening INPATIENT [[id number]] for appropriate misoprostol dosing     No current outpatient medications on file.       Current Diet  Orders Placed This Encounter      Consistent Carbohydrate Diet 3158-0892 Calories: Moderate Consistent CHO (4-6 CHO units/meal)        Assessment  Ms. Kelsey Azar is a 23 yo woman,  at 29w6d of pregnancy with fetal demise following cardioplegia for multiple fetal anomalies, type 1 diabetes without complication, who was admitted 3/5/2020 for induction of labor.    Vaginal delivery will not be imminent (possibly not for another 24h per bedside RN), Csection still a possibility per notes.    BG trending up during day (insufficient rapid acting insulin) and coming down steadily overnight to the 90s this morning.    Anticipate the insulin needs will decrease rapidly following delivery.      Plan  -detemir decreased from 24 to 18 units at bedtime starting tonight  -aspart for meals and snacks increased from 1unit/8g to 1unit/6g CHO starting this afternoon.  -aspart for correction: continue 1unit/50 for BG >140 TID AC, pt requests to increase threshold for correction at HS (and we will add at 0200 too) for BG >140 (rather than 200).  -monitor glucose TID AC, HS and 0200, 2 hour post prandial glucoses discontinued.  -continuous prn order for D5LR to start at 50ml/h if glucose is <80, stop once glucose is >150 (this is mainly in place to help with possible  hypoglycemia post delivery).    We will continue to follow.    Daniela Azar PA-C [phone]  Diabetes Management Team Job Code [id number]

## 2020-03-07 LAB
GLUCOSE BLDC GLUCOMTR-MCNC: 154 MG/DL (ref 70–99)
GLUCOSE BLDC GLUCOMTR-MCNC: 161 MG/DL (ref 70–99)
GLUCOSE BLDC GLUCOMTR-MCNC: 184 MG/DL (ref 70–99)
GLUCOSE BLDC GLUCOMTR-MCNC: 186 MG/DL (ref 70–99)
GLUCOSE BLDC GLUCOMTR-MCNC: 189 MG/DL (ref 70–99)
GLUCOSE BLDC GLUCOMTR-MCNC: 218 MG/DL (ref 70–99)
GLUCOSE BLDC GLUCOMTR-MCNC: 252 MG/DL (ref 70–99)

## 2020-03-07 PROCEDURE — 00000146 ZZHCL STATISTIC GLUCOSE BY METER IP

## 2020-03-07 PROCEDURE — 25000128 H RX IP 250 OP 636: Performed by: STUDENT IN AN ORGANIZED HEALTH CARE EDUCATION/TRAINING PROGRAM

## 2020-03-07 PROCEDURE — 25800030 ZZH RX IP 258 OP 636: Performed by: STUDENT IN AN ORGANIZED HEALTH CARE EDUCATION/TRAINING PROGRAM

## 2020-03-07 PROCEDURE — 25000132 ZZH RX MED GY IP 250 OP 250 PS 637: Performed by: STUDENT IN AN ORGANIZED HEALTH CARE EDUCATION/TRAINING PROGRAM

## 2020-03-07 PROCEDURE — 25000128 H RX IP 250 OP 636: Performed by: ANESTHESIOLOGY

## 2020-03-07 PROCEDURE — 25000128 H RX IP 250 OP 636

## 2020-03-07 PROCEDURE — 12000001 ZZH R&B MED SURG/OB UMMC

## 2020-03-07 RX ORDER — EPHEDRINE SULFATE 50 MG/ML
5 INJECTION, SOLUTION INTRAMUSCULAR; INTRAVENOUS; SUBCUTANEOUS
Status: DISCONTINUED | OUTPATIENT
Start: 2020-03-07 | End: 2020-03-08

## 2020-03-07 RX ORDER — FENTANYL/BUPIVACAINE/NS/PF 2-1250MCG
PLASTIC BAG, INJECTION (ML) INJECTION
Status: COMPLETED
Start: 2020-03-07 | End: 2020-03-07

## 2020-03-07 RX ORDER — NALBUPHINE HYDROCHLORIDE 10 MG/ML
2.5-5 INJECTION, SOLUTION INTRAMUSCULAR; INTRAVENOUS; SUBCUTANEOUS EVERY 6 HOURS PRN
Status: DISCONTINUED | OUTPATIENT
Start: 2020-03-07 | End: 2020-03-08

## 2020-03-07 RX ORDER — AMPICILLIN 2 G/1
2 INJECTION, POWDER, FOR SOLUTION INTRAVENOUS EVERY 6 HOURS
Status: DISCONTINUED | OUTPATIENT
Start: 2020-03-07 | End: 2020-03-08

## 2020-03-07 RX ORDER — CITRIC ACID/SODIUM CITRATE 334-500MG
30 SOLUTION, ORAL ORAL ONCE
Status: DISCONTINUED | OUTPATIENT
Start: 2020-03-07 | End: 2020-03-08

## 2020-03-07 RX ORDER — NALOXONE HYDROCHLORIDE 0.4 MG/ML
.1-.4 INJECTION, SOLUTION INTRAMUSCULAR; INTRAVENOUS; SUBCUTANEOUS
Status: DISCONTINUED | OUTPATIENT
Start: 2020-03-07 | End: 2020-03-08

## 2020-03-07 RX ORDER — ACETAMINOPHEN 325 MG/1
975 TABLET ORAL EVERY 6 HOURS
Status: DISCONTINUED | OUTPATIENT
Start: 2020-03-07 | End: 2020-03-08

## 2020-03-07 RX ORDER — IBUPROFEN 800 MG/1
800 TABLET, FILM COATED ORAL EVERY 6 HOURS PRN
Status: DISCONTINUED | OUTPATIENT
Start: 2020-03-07 | End: 2020-03-07

## 2020-03-07 RX ORDER — MISOPROSTOL 100 UG/1
100 TABLET ORAL EVERY 4 HOURS PRN
Status: DISCONTINUED | OUTPATIENT
Start: 2020-03-07 | End: 2020-03-08

## 2020-03-07 RX ADMIN — GENTAMICIN SULFATE 160 MG: 40 INJECTION, SOLUTION INTRAMUSCULAR; INTRAVENOUS at 23:37

## 2020-03-07 RX ADMIN — Medication: at 12:56

## 2020-03-07 RX ADMIN — Medication: at 21:54

## 2020-03-07 RX ADMIN — MISOPROSTOL 100 MCG: 100 TABLET ORAL at 20:10

## 2020-03-07 RX ADMIN — MISOPROSTOL 100 MCG: 100 TABLET ORAL at 15:28

## 2020-03-07 RX ADMIN — SODIUM CHLORIDE, POTASSIUM CHLORIDE, SODIUM LACTATE AND CALCIUM CHLORIDE: 600; 310; 30; 20 INJECTION, SOLUTION INTRAVENOUS at 19:17

## 2020-03-07 RX ADMIN — SODIUM CHLORIDE, POTASSIUM CHLORIDE, SODIUM LACTATE AND CALCIUM CHLORIDE 500 ML: 600; 310; 30; 20 INJECTION, SOLUTION INTRAVENOUS at 12:12

## 2020-03-07 RX ADMIN — AMPICILLIN SODIUM 2 G: 2 INJECTION, POWDER, FOR SOLUTION INTRAMUSCULAR; INTRAVENOUS at 23:09

## 2020-03-07 NOTE — ANESTHESIA PROCEDURE NOTES
Epidural Procedure Note    Staff:     Anesthesiologist:  Petra Antoine MD  Location: OB     Procedure start time:  3/7/2020 12:40 PM     Procedure end time:  3/7/2020 12:53 PM   Pre-procedure checklist:   patient identified, IV checked, site marked, risks and benefits discussed, informed consent, monitors and equipment checked and pre-op evaluation      Correct Patient: Yes      Correct Position: Yes      Correct Site: Yes      Correct Procedure: Yes      Correct Laterality:  N/A    Site Marked:  N/A  Procedure:     Procedure:  Epidural catheter    ASA:  2    Diagnosis:  Iufd    Position:  Sitting    Sterile Prep: chloraprep      Insertion site:  L3-4    Local skin infiltration:  1% lidocaine    Approach:  Midline    Needle gauge (G):  17    Needle Length (in):  3.5    Block Needle Type:  Touhy    Injection Technique:  LORT saline    GASPER at (cm):  4    Attempts:  1    Redirects:  0    Catheter gauge (G):  19    Catheter threaded easily: Yes      Threaded to cm at skin:  10    Threaded in epidural space (cm):  6    Paresthesias:  No    Aspiration negative for Heme or CSF: Yes      Test dose (mL):  5     Local anesthetic:  Lidocaine 1.5% w/ 1:200,000 epinephrine    Test dose time:  12:47    Test dose negative for signs of intravascular, subdural or intrathecal injection: Yes

## 2020-03-07 NOTE — PROGRESS NOTES
"SPIRITUAL HEALTH SERVICES  SPIRITUAL ASSESSMENT Progress Note  [facility] ([facility]) Labor and Delivery   ON-CALL VISIT    REFERRAL SOURCE: Paged per family request    Referred to see pt to provide information about possibilities for blessing/naming ceremony. Met pt's mom Sheyla in [facility] and discussed difference between Gnosticism and blessing. She stated Kelsey and Fausto would be reluctant to have anything \"too preachy\". Sheyla mentioned her Pentecostalism yeny is important, perhaps moreso than to pt. Affirmed we can have a discussion to assess what will be most fitting to accommodate preferences and incorporate them into a cohesive ritual accordingly. Sheyla mentioned baby Anna is named after her mother, who  when she was a teenager. Thus naming ceremony will have very deep significance.     As we entered room to discuss possibility of blessing with Kelsey and Fausto, MD and RN entered and initiated conversation. Next, pt received phone call from her usual MD, for which she requested some privacy. Accompanied RN with a few family members [facility] to process conversation, which stirred up some confusion and worry.     Afterward, provided emotional support to pt's brother, who was expressing concern for pt, as well as difficulty coping with anger and fear. Offered validation of experience and reframing elements of conversation. Visited ended prior to discussing blessing with pt, due to being called away to an emergency. Communicated this to pt and family. Expressed hope to return later in the evening to bring examples of blessings and learn more about their hopes and wishes. Pt and family agreeable to this. Due to additional urgent calls, unable to return at a reasonable waking hour, but triaged visit for on-call  tomorrow morning. Let RN know of plan.    PLAN: Notify tomorrow's on-call  for follow-up.    Jessica Grande  Chaplain Resident  Pager [phone]    [facility] remains available  for emergent " requests/referrals, either by having the switchboard page the on-call  or by entering an ASAP/STAT consult in Epic (this will also page the on-call ).

## 2020-03-07 NOTE — PLAN OF CARE
Patient and family requesting to speak with provider/care team about  delivery.  Currently NPO since 1200.

## 2020-03-07 NOTE — PROGRESS NOTES
Diabetes Consult Daily  Progress Note          Assessment/Plan:     Ms. Kelsey Azar is a 23 yo woman,  at 29w6d of pregnancy with fetal demise following cardioplegia for multiple fetal anomalies, type 1 diabetes without complication, who was admitted 3/5/2020 for induction of labor.    Pre-meal and overnight glucoses ranging 150s-180s in past 24h.  Up to mid 200s 2 hours after high carb meal midday.    Plan:  -continue detemir 18 units at bedtime tonight if still no delivery, if pt has delivered please give 12 units at bedtime.  - aspart for meals and snacks: continue 1unit/6g CHO-- post prandial glucose indicates pt needs higher dose to get BG in better range, but given that labor will likely progress after placement of cook we will continue with this lower ratio.  -aspart for correction: continue 1unit/50 for BG >140 TID AC, and >140 at HS and 0200 (lower threshold per pt request).  -monitor glucose ac, hs and 0200. No post prandial glucose checks needed at this point.    Plan for after delivery  - start continuous prn D5LR at 50ml/h if glucose <80, stop once glucose >150.  - decrease detemir to 12 units at bedtime  - aspart for meals and snacks: decrease to 1unit/12g CHO  - aspart for correction: medium intensity ac and hs  - monitor glucose ac, hs and 0200.    We will continue to follow.       Outpatient diabetes follow up: with Kendy Luna NP Endocrinology at [facility], within 1-2 weeks of discharge.  Plan discussed with patient and bedside RN.           Interval History:     The last 24 hours progress and nursing notes reviewed.  Vandana was comfortable when I visited.  Per notes epidural is now in place, and plan for cook catheter placement.  Glucoses have still been rising to 200s at times (just now up to 252 two hours after she had a very large carb meal, 129g CHO).  BG 180s at 0200 and 150s late morning.  Vandana says she is comfortable with where her glucoses  "are running.      We discussed plans for insulin reductions after delivery: Vandana would like to first try resuming her pre-pregnancy insulin doses, which seems like a reasonably starting point (A1c was high prior to pregnancy, but it is unclear if this was due to insufficient insulin doses or pt not adhering to insulin regimen).    PTA Regimen:  During Pregnancy  Levemir 24 units nightly  Humalog 1 unit per 8 g carbohydrate  Humalog 1 unit per 50 for blood sugar greater than 100  Checking glucose fasting and pre-meal     Pre-pregnancy  Basaglar 12 units nightly  Admelog 1 unit per 12 g carbohydrate  Admelog 1 unit per 50 for blood sugar greater than 100    Recent Labs   Lab 03/07/20  1110 03/07/20  0202 03/06/20  2203 03/06/20  1428 03/06/20  1019 03/06/20  0517   * 186* 182* 217* 98 162*               Review of Systems:   See interval hx          Medications:     Orders Placed This Encounter      NPO per Anesthesia Guidelines for Procedure/Surgery Except for: Meds      Physical Exam:  Gen: Alert, sitting up in bed, appears comfortable, in NAD.  Multiple family members present.   HEENT: NC/AT, mucous membranes are moist  Resp: Unlabored  Neuro:oriented x3, communicating clearly  /59   Pulse 60   Temp 97.5  F (36.4  C) (Oral)   Resp 16   Ht 1.676 m (5' 6\")   Wt 79.4 kg (175 lb)   LMP  (LMP Unknown)   SpO2 97%   BMI 28.25 kg/m             Data:   No results found for: A1C           CBC RESULTS:   Recent Labs   Lab Test 03/06/20  1934   WBC 13.1*   RBC 4.09   HGB 12.7   HCT 36.8   MCV 90   MCH 31.1   MCHC 34.5   RDW 12.0          Daniela Azar PA-C [phone]  Diabetes Management job code [id number]            "

## 2020-03-07 NOTE — PLAN OF CARE
Changes in plans today.  Patient initially decided she wanted a  delivery but after discussing with several providers decided to rest tonight with sleep meds and then continue with induction tomorrow after getting an epidural.  See provider note for full details.  Initially family and support persons were very upset and frustrated with changes in plans but ultimately want to support Vandana's decisions.  Dr Morales was able to call unit and discuss this with Vandana and she is feeling better about her decision to hold on  delivery after that discussion.  Dr Noyola also spent a good amount of time with patient and family discussing possible plans of care and creating a plan that Vandana feels comfortable with.  Sleep meds given for therapeutic sleep.  Aqua K pad given for comfort.  Patient took a shower, egg crate mattress on bed for comfort. IV removed for night, site symptomatic. Patient educated that it would need to be restarted in an emergent situation should it arise or in the AM before resuming induction. Insulin doses adjusted per endocrine.   came to see patient and family but arrived during a very emotional conversation with the provider, she plans on having  services check in tomorrow to further discuss plans for naming ceremony/vs Mandaeism.  [facility] contacted as family desires pictures but unable to reach them, plan for someone to call them in AM.  Vandana would like the baby to be cleaned up and dressed before showing to family.  Vandana and Fausto are looking forward to getting rest tonight. VS stable, afebrile. Continue with plan of care.

## 2020-03-07 NOTE — PROGRESS NOTES
"PHONE NOTE    24 year old   undergoing IOL for multiple fetal anomalies with fetal demise at 29w4d     Called patient to check in on progress, was at on off-site clinic today and not able to see her.  Vandana admits to being exhausted, in pain with examinations and \"ready to be done.\"  She has been considering a  so that the process is over.       I acknowledged that none of us know what she has been through in the last few days.   I acknowledged that she must be exhausted.  I did reassure her that the time course of the IOL is very normal with this early phase taking a considerable amount of time.  I reassured her that nothing abnormal is currently happening.       We again reviewed the risks and morbidity of  delivery, including classical hysterotomy and due to her exhaustion I encouraged her to at least sleep on that decision.  We discussed possible need for cesareans in all her future pregnancies (if classical hysterotomy) and the risk of morbidly adherent placenta.  We discussed the prolonged recovery from a  versus a vaginal delivery.       We discussed that there is not a zero risk of head entrapment, but given the decompressive procedure that she had yesterday we are optimistic that there is not a high chance of this.       Given the significant morbidity of a  and her long-term reproductive risks of I have asked Vandana to wait on that decision.  I have offered her to either to get an epidural and proceed, if comfortable, with IOL or taking a break and getting sleep tonight with therapeutic rest.       Vandana asked me to talk with her mom, Sheyla, and I reviewed all of this with her as well.  They will discuss those two proposed options.  This was all reviewed with Dr. Noyola, the on call for [facility], who will address the plan with them.      Victoria Morales MD  , OB/GYN  Maternal-Fetal Medicine  [email or web address]  [phone] (Academic office)  [phone] " (Pager)

## 2020-03-07 NOTE — PROGRESS NOTES
"Labor Progress Note     S: slept well overnight, wondering about plan, discussed with partner and open to continued IOL        O:  /65   Pulse 60   Temp 97.5  F (36.4  C) (Oral)   Resp 16   Ht 1.676 m (5' 6\")   Wt 79.4 kg (175 lb)   LMP  (LMP Unknown)   BMI 28.25 kg/m      I/O this shift:  In: 3 [I.V.:3]  Out: -      Exam    Cervix: deferred    A/P:  29w4d (at cardioplegia) IOL for multiple fetal anomalies    -- reviewed IOL process, increased dose of miso  -- will plan epidural now and then bray when able in addition to miso and/or pit    Kimberly Pelaez MD MPH        "

## 2020-03-07 NOTE — PLAN OF CARE
Pt. Got an epidural as requested She denies pain.  She is eating lunch with her family, they are very supportive. Her blood sugars has bit higher today than yesterday. Daniela FLETCHER updated.

## 2020-03-07 NOTE — TELEPHONE ENCOUNTER
"24 year old   undergoing IOL for multiple fetal anomalies with fetal demise at 29w4d    Called patient to check in on progress, was at on off-site clinic today and not able to see her.  Vandana admits to being exhausted, in pain with examinations and \"ready to be done.\"  She has been considering a  so that the process is over.      I acknowledged that none of us know what she has been through in the last few days.   I acknowledged that she must be exhausted  I did reassure her that the time course of the IOL is very normal with this early phase taking a considerable amount of time.  I reassured her that nothing abnormal is currently happening.      We again reviewed the risks and morbidity of  delivery, including classical hysterotomy and due to her exhaustion I encouraged her to at least sleep on that decision.  We discussed possible need for cesareans in all her future pregnancies (if classical hysterotomy) and the risk of morbidly adherent placenta.  We discussed the prolonged recovery from a  versus a vaginal delivery.      We discussed that there is not a zero risk of head entrapment, but given the decompressive procedure that she had yesterday we are optimistic that there is not a high chance of this.      Given the significant morbidity of a  and her long-term reproductive risks of I have asked Vandana to wait on that decision.  I have offered her to either to get an epidural and proceed, if comfortable, with IOL or taking a break and getting sleep tonight with therapeutic rest.      Vandana asked me to talk with her mom, Sheyla, and I reviewed all of this with her as well.  They will discuss those two proposed options.  This was all reviewed with Dr. Noyola, the on call for [facility], who will address the plan with them.      Victoria Morales MD  , OB/GYN  Maternal-Fetal Medicine  [email or web address]  [phone] (Academic office)  [phone] (Pager)       "

## 2020-03-07 NOTE — PROGRESS NOTES
"Labor Progress Note     S: comfortable with epidural, glad she got it as exams are much less uncomfortable        O:  /59   Pulse 60   Temp 97.5  F (36.4  C) (Oral)   Resp 16   Ht 1.676 m (5' 6\")   Wt 79.4 kg (175 lb)   LMP  (LMP Unknown)   SpO2 97%   BMI 28.25 kg/m      I/O this shift:  In: -   Out: 500 [Urine:500]     Exam    Cervix: 2/60/-3, posterior, medium      A/P:  29w4d (at cardioplegia) IOL for multiple fetal anomalies    -- discussed good change in cervical exam (even without miso overnight)  -- miso 100 mcg PV placed  -- cont IOL  -- reviewed in event of head entrapment, we would proceed with decompression in the OR (and placental extraction)    Kimberly Pelaez MD MPH        "

## 2020-03-08 VITALS
TEMPERATURE: 98.2 F | DIASTOLIC BLOOD PRESSURE: 77 MMHG | OXYGEN SATURATION: 96 % | HEIGHT: 66 IN | WEIGHT: 175 LBS | SYSTOLIC BLOOD PRESSURE: 131 MMHG | RESPIRATION RATE: 16 BRPM | HEART RATE: 60 BPM | BODY MASS INDEX: 28.12 KG/M2

## 2020-03-08 LAB
GLUCOSE BLDC GLUCOMTR-MCNC: 146 MG/DL (ref 70–99)
GLUCOSE BLDC GLUCOMTR-MCNC: 167 MG/DL (ref 70–99)
GLUCOSE BLDC GLUCOMTR-MCNC: 225 MG/DL (ref 70–99)
GLUCOSE BLDC GLUCOMTR-MCNC: 246 MG/DL (ref 70–99)
GLUCOSE BLDC GLUCOMTR-MCNC: 255 MG/DL (ref 70–99)

## 2020-03-08 PROCEDURE — 25000125 ZZHC RX 250

## 2020-03-08 PROCEDURE — 25000128 H RX IP 250 OP 636: Performed by: STUDENT IN AN ORGANIZED HEALTH CARE EDUCATION/TRAINING PROGRAM

## 2020-03-08 PROCEDURE — 12000001 ZZH R&B MED SURG/OB UMMC

## 2020-03-08 PROCEDURE — 25000132 ZZH RX MED GY IP 250 OP 250 PS 637: Performed by: STUDENT IN AN ORGANIZED HEALTH CARE EDUCATION/TRAINING PROGRAM

## 2020-03-08 PROCEDURE — 88307 TISSUE EXAM BY PATHOLOGIST: CPT | Performed by: STUDENT IN AN ORGANIZED HEALTH CARE EDUCATION/TRAINING PROGRAM

## 2020-03-08 PROCEDURE — 25000128 H RX IP 250 OP 636: Performed by: ANESTHESIOLOGY

## 2020-03-08 PROCEDURE — 72200001 ZZH LABOR CARE VAGINAL DELIVERY SINGLE

## 2020-03-08 PROCEDURE — 00000146 ZZHCL STATISTIC GLUCOSE BY METER IP

## 2020-03-08 PROCEDURE — 25800030 ZZH RX IP 258 OP 636: Performed by: STUDENT IN AN ORGANIZED HEALTH CARE EDUCATION/TRAINING PROGRAM

## 2020-03-08 PROCEDURE — 88307 TISSUE EXAM BY PATHOLOGIST: CPT | Mod: 26 | Performed by: STUDENT IN AN ORGANIZED HEALTH CARE EDUCATION/TRAINING PROGRAM

## 2020-03-08 RX ORDER — MISOPROSTOL 200 UG/1
TABLET ORAL
Status: DISPENSED
Start: 2020-03-08 | End: 2020-03-08

## 2020-03-08 RX ORDER — OXYTOCIN/0.9 % SODIUM CHLORIDE 30/500 ML
340 PLASTIC BAG, INJECTION (ML) INTRAVENOUS CONTINUOUS PRN
Status: DISCONTINUED | OUTPATIENT
Start: 2020-03-08 | End: 2020-03-09 | Stop reason: HOSPADM

## 2020-03-08 RX ORDER — BISACODYL 10 MG
10 SUPPOSITORY, RECTAL RECTAL DAILY PRN
Status: DISCONTINUED | OUTPATIENT
Start: 2020-03-10 | End: 2020-03-09 | Stop reason: HOSPADM

## 2020-03-08 RX ORDER — OXYTOCIN/0.9 % SODIUM CHLORIDE 30/500 ML
PLASTIC BAG, INJECTION (ML) INTRAVENOUS
Status: COMPLETED
Start: 2020-03-08 | End: 2020-03-08

## 2020-03-08 RX ORDER — NALOXONE HYDROCHLORIDE 0.4 MG/ML
.1-.4 INJECTION, SOLUTION INTRAMUSCULAR; INTRAVENOUS; SUBCUTANEOUS
Status: DISCONTINUED | OUTPATIENT
Start: 2020-03-08 | End: 2020-03-09 | Stop reason: HOSPADM

## 2020-03-08 RX ORDER — MISOPROSTOL 200 UG/1
800 TABLET ORAL
Status: DISCONTINUED | OUTPATIENT
Start: 2020-03-08 | End: 2020-03-09 | Stop reason: HOSPADM

## 2020-03-08 RX ORDER — HYDROXYZINE HYDROCHLORIDE 25 MG/1
25-50 TABLET, FILM COATED ORAL
Status: DISCONTINUED | OUTPATIENT
Start: 2020-03-08 | End: 2020-03-09 | Stop reason: HOSPADM

## 2020-03-08 RX ORDER — CARBOPROST TROMETHAMINE 250 UG/ML
250 INJECTION, SOLUTION INTRAMUSCULAR
Status: DISCONTINUED | OUTPATIENT
Start: 2020-03-08 | End: 2020-03-09 | Stop reason: HOSPADM

## 2020-03-08 RX ORDER — HYDROXYZINE HYDROCHLORIDE 50 MG/1
50-100 TABLET, FILM COATED ORAL
Status: COMPLETED | OUTPATIENT
Start: 2020-03-08 | End: 2020-03-08

## 2020-03-08 RX ORDER — ACETAMINOPHEN 325 MG/1
650 TABLET ORAL EVERY 4 HOURS PRN
Status: DISCONTINUED | OUTPATIENT
Start: 2020-03-08 | End: 2020-03-09 | Stop reason: HOSPADM

## 2020-03-08 RX ORDER — METHYLERGONOVINE MALEATE 0.2 MG/ML
200 INJECTION INTRAVENOUS
Status: DISCONTINUED | OUTPATIENT
Start: 2020-03-08 | End: 2020-03-09 | Stop reason: HOSPADM

## 2020-03-08 RX ORDER — AMOXICILLIN 250 MG
1 CAPSULE ORAL 2 TIMES DAILY
Status: DISCONTINUED | OUTPATIENT
Start: 2020-03-08 | End: 2020-03-09 | Stop reason: HOSPADM

## 2020-03-08 RX ORDER — IBUPROFEN 800 MG/1
800 TABLET, FILM COATED ORAL EVERY 6 HOURS PRN
Status: DISCONTINUED | OUTPATIENT
Start: 2020-03-08 | End: 2020-03-09 | Stop reason: HOSPADM

## 2020-03-08 RX ORDER — OXYTOCIN/0.9 % SODIUM CHLORIDE 30/500 ML
100 PLASTIC BAG, INJECTION (ML) INTRAVENOUS CONTINUOUS
Status: DISCONTINUED | OUTPATIENT
Start: 2020-03-08 | End: 2020-03-09 | Stop reason: HOSPADM

## 2020-03-08 RX ORDER — LIDOCAINE HYDROCHLORIDE 10 MG/ML
INJECTION, SOLUTION EPIDURAL; INFILTRATION; INTRACAUDAL; PERINEURAL
Status: DISCONTINUED
Start: 2020-03-08 | End: 2020-03-08 | Stop reason: WASHOUT

## 2020-03-08 RX ORDER — OXYTOCIN 10 [USP'U]/ML
10 INJECTION, SOLUTION INTRAMUSCULAR; INTRAVENOUS
Status: DISCONTINUED | OUTPATIENT
Start: 2020-03-08 | End: 2020-03-09 | Stop reason: HOSPADM

## 2020-03-08 RX ORDER — AMOXICILLIN 250 MG
2 CAPSULE ORAL 2 TIMES DAILY
Status: DISCONTINUED | OUTPATIENT
Start: 2020-03-08 | End: 2020-03-09 | Stop reason: HOSPADM

## 2020-03-08 RX ADMIN — HYDROXYZINE HYDROCHLORIDE 50 MG: 25 TABLET, FILM COATED ORAL at 22:46

## 2020-03-08 RX ADMIN — SENNOSIDES AND DOCUSATE SODIUM 1 TABLET: 8.6; 5 TABLET ORAL at 20:14

## 2020-03-08 RX ADMIN — IBUPROFEN 800 MG: 800 TABLET ORAL at 10:47

## 2020-03-08 RX ADMIN — FENTANYL CITRATE 100 MCG: 50 INJECTION, SOLUTION INTRAMUSCULAR; INTRAVENOUS at 08:25

## 2020-03-08 RX ADMIN — MISOPROSTOL 100 MCG: 100 TABLET ORAL at 06:21

## 2020-03-08 RX ADMIN — HYDROXYZINE HYDROCHLORIDE 100 MG: 50 TABLET, FILM COATED ORAL at 01:20

## 2020-03-08 RX ADMIN — ACETAMINOPHEN 650 MG: 325 TABLET, FILM COATED ORAL at 15:44

## 2020-03-08 RX ADMIN — AMPICILLIN SODIUM 2 G: 2 INJECTION, POWDER, FOR SOLUTION INTRAMUSCULAR; INTRAVENOUS at 05:59

## 2020-03-08 RX ADMIN — FENTANYL CITRATE 50 MCG: 50 INJECTION, SOLUTION INTRAMUSCULAR; INTRAVENOUS at 07:22

## 2020-03-08 RX ADMIN — Medication 340 ML/HR: at 08:38

## 2020-03-08 RX ADMIN — MISOPROSTOL 100 MCG: 100 TABLET ORAL at 00:40

## 2020-03-08 RX ADMIN — Medication: at 07:53

## 2020-03-08 RX ADMIN — GENTAMICIN SULFATE 120 MG: 40 INJECTION, SOLUTION INTRAMUSCULAR; INTRAVENOUS at 07:43

## 2020-03-08 NOTE — L&D DELIVERY NOTE
Spontaneous Breech Vaginal Delivery Note  3/8/2020     Kelsey Azar is a 24 year old  who presented at 29w5d for induction of [place].  She received 200 mg mifepristone on admission followed by misoprostol 25 mcg PV x 6 doses for induction without much change in her cervix.  She then rested overnight on HD#2 and resumed IOL with misoprostol 100 mcg PV x 4 doses.   She was noted to be complete at 0640.     SROM for clear fluid occurred just before she started pushing.  She delivered a stillborn female infant breech.  No heart tones or spontaneous movement after delivery.  Weight pending. The cord was double clamped and cut.  The infant was taken to the warmer and wrapped and then returned to the patient.     The placenta was then delivered using gentle traction and fundal massage and appeared to be intact with 2 VC.  The uterus was noted to be firm after fundal massage.  The perineum was assessed for lacerations and none were noted.      Total QBL pending,  mL.  The patient declines autopsy, still considering chromosomal testing from the placenta. Fetal survey to follow.  The patient tolerated the procedure well.    MD Jack

## 2020-03-08 NOTE — PROGRESS NOTES
"SPIRITUAL HEALTH SERVICES  SPIRITUAL ASSESSMENT Progress Note  [facility] ([facility]) Birthplace     REFERRAL SOURCE: On-call page    During my visit pt Vandana and SHARON Lambert requested a short blessing for their baby girl Anna.  They expressed their fatigue from the last several days and their desire to \"be done\" and get some rest. Provided emotional and spiritual support, affirming their roles as loving parents and provided brief blessing for Anna. Parents are not significantly connected to a yeny tradition, but wanted to bless baby and surround her with love. Both were tearful during the blessing.     Provided additional spiritual and emotional support to extended family as they waited outside Vandana's room, after the blessing.     PLAN: No follow up plan at this time. [facility] remains available for any further needs or requests.     SAADIA Tello.  Associate    Pager [phone]    * [facility] remains available 24/7 for emergent requests/referrals, either by having the switchboard page the on-call  or by entering an ASAP/STAT consult in Epic (this will also page the on-call ).*     "

## 2020-03-08 NOTE — PLAN OF CARE
Pt resting intermittently throughout the night, VSS, afebrile. Comfortable with epidural and vistaril, coping appropriately with loss. Antibiotics and ibuprofen given for triple I protocol. 0200 blood sugar 255mg/dL, correction insulin given per orders. Continuing with high dose miso for induction. Pt requested to see Hospital  overnight to discuss naming ceremony and Zoroastrianism options.  at bedside to consult patient. SROM at 0640 with clear fluid, pt does not feel pressure or urge to push. Will continue to monitor closely and plan for  in OR.

## 2020-03-08 NOTE — PLAN OF CARE
Provider ALEXEY Wheeler and MARIEL Pelaez, notified that patient now febrile 103.0 oral, and intermittently tachycardic.  Patient also experiencing rigors.  Plan per care team: antibiotics and acetaminophen.  Patient notified that antibiotics were likely and that providers would be in to speak to her when they are available.  Patient denies further questions and is amenable to plan. Will continue to monitor.

## 2020-03-08 NOTE — DISCHARGE INSTRUCTIONS
DIABETES   Insulin plan:  -Levemir 12 units at bedtime  -Novolog 1 unit/12g carbohdyrate with meals and snacks  -Novolog for correction: resume your usual sliding scale of 1unit/50  -check blood sugars before meals and bedtime- more frequent than this as needed (anticipate your insulin needs will be considerably less now post delivery).  -follow up with Kendy Luna in the next 1-2 weeks.    If you have questions regarding your diabetes discharge treatment plan within the first week following discharge, call the inpatient diabetes management team.  Daniela Azar PA-C (available Friday through Monday) and Melly HERNANDEZ (available Monday through Thursday) both at [phone], Loly Corado NP (available Monday through Friday) and Yoselin Pratt PA-C (available Tuesday through Friday) both at [phone], or the on-call endocrinologist can be paged by the hospital  [phone].

## 2020-03-08 NOTE — PROGRESS NOTES
"Labor Progress Note     S: comfortable with epidural, doesn't feel hot anymore since fever has gone away. Does have a little more vaginal soreness, though doing ok. Would like something for sleep        O:  /77   Pulse 60   Temp 98.8  F (37.1  C) (Oral)   Resp 16   Ht 1.676 m (5' 6\")   Wt 79.4 kg (175 lb)   LMP  (LMP Unknown)   SpO2 98%   BMI 28.25 kg/m      I/O this shift:  In: -   Out: 500 [Urine:500]     Exam    Cervix: 3/90/-3, posterior, soft      A/P:  29w4d (at cardioplegia) IOL for multiple fetal anomalies    -- miso 100 mcg PV placed, continue q4h  -- cont IOL  -- reviewed that antibiotics given because we cannot rule out infection. However believe that fevers are likely due to misoprostol. Continue with ibuprofen/Tylenol PRN for fevers. Amp/gent running    Jeny Wheeler MD  Obstetrics and Gynecology, PGY-3  March 8, 2020 , 1:04 AM          "

## 2020-03-08 NOTE — PROGRESS NOTES
Diabetes Consult Daily  Progress Note          Assessment/Plan:     Ms. Kelsey Azar is a 23 yo woman,  at 29w6d of pregnancy with fetal demise following cardioplegia for multiple fetal anomalies, type 1 diabetes without complication, who was admitted 3/5/2020 for induction of labor. S/p spontaneous breech vaginal delivery on 3/8/20.    BG high 100s-200s yesterday, 200s overnight, and 200s today post delivery.  Anticipate insulin needs will decrease now that pt is post partum.    Plan:  - start continuous prn D5LR at 50ml/h if glucose <80, stop once glucose >150.  - decreased detemir to 12 units at bedtime  - aspart for meals and snacks: decreased to 1unit/12g CHO  - aspart for correction: medium intensity ac and hs (hs correction threshold changed to >200, no correction ordered for 0200).  - monitor glucose ac, hs and 0200-- more frequently today if concern glucose is dropping low while higher dose of detemir is still on board.    -if Vandana discharges today recommend she continue with same insulin doses as outlined above, frequent glucose checks, and close outpatient follow up.  Ph#s for IP DM team included in AVS in case pt has questions after discharge.    We will continue to follow while in hospital.       Outpatient diabetes follow up: with Kendy Luna NP Endocrinology at [facility], within 1-2 weeks of discharge.  Plan discussed with patient and family, bedside RN.           Interval History:     The last 24 hours progress and nursing notes reviewed.  Vandana is s/p spontaneous breech vaginal delivery around 0930 this morning.  When I saw her midday she was feeling ok.  BG yesterday were in the high 100s-200s.  She got detemir 18 units last night and BG was 255 during the night, 225 about 1hour post delivery.  She ate a meal (90gCHO) after delivery and got pre-pregnancy aspart dose of 1unit/12g CHO.  Note sure when pt will discharge (per bedside RN, may be later  "today).  We discussed need for more frequent glucose checks today and the next few days- insulin needs will be lower than pregnancy needs.  She will need close outpatient follow up.    PTA Regimen:  During Pregnancy  Levemir 24 units nightly  Humalog 1 unit per 8 g carbohydrate  Humalog 1 unit per 50 for blood sugar greater than 100  Checking glucose fasting and pre-meal     Pre-pregnancy  Basaglar 12 units nightly  Admelog 1 unit per 12 g carbohydrate  Admelog 1 unit per 50 for blood sugar greater than 100    Recent Labs   Lab 03/08/20  1042 03/08/20  0203 03/07/20  2250 03/07/20  2051 03/07/20  1926 03/07/20  1438   * 255* 184* 189* 161* 218*               Review of Systems:   See interval hx          Medications:     Orders Placed This Encounter      NPO per Anesthesia Guidelines for Procedure/Surgery Except for: Meds      Physical Exam:  Gen: Alert, sitting up in bed, in NAD.  Multiple family members present.   HEENT: NC/AT, mucous membranes are moist  Resp: Unlabored  Neuro:oriented x3, communicating clearly  /66   Pulse 60   Temp 98.7  F (37.1  C) (Oral)   Resp 16   Ht 1.676 m (5' 6\")   Wt 79.4 kg (175 lb)   LMP  (LMP Unknown)   SpO2 97%   BMI 28.25 kg/m             Data:   No results found for: A1C         No results for input(s): NA, POTASSIUM, CHLORIDE, CO2, ANIONGAP, GLC, BUN, CR, DAMARIS in the last 85083 hours.  CBC RESULTS:   Recent Labs   Lab Test 03/06/20  1934   WBC 13.1*   RBC 4.09   HGB 12.7   HCT 36.8   MCV 90   MCH 31.1   MCHC 34.5   RDW 12.0          Daniela Azar PA-C [phone]  Diabetes Management job code [id number]            "

## 2020-03-08 NOTE — PROVIDER NOTIFICATION
03/08/20 0705   Provider Notification   Provider Name/Title Dr. Wheeler   Method of Notification At Bedside   Notification Reason Labor Status   provider at bedside. Initiating pushing efforts at this time. Pt reporting increased pain and pressure unrelieved with pressing epidural bolus button. Plan to administer 50mcg fentanyl for breakthrough pain and continue to push. Room prepared for delivery. Report given to John FLOYD RN at this time.

## 2020-03-08 NOTE — PROVIDER NOTIFICATION
03/08/20 0041   Provider Notification   Provider Name/Title Dr. Wheeler and Dr. Pelaez   Method of Notification At Bedside   Notification Reason Labor Status;Status Update   Providers at bedside for SVE and miso insertion. SVE 3/80/-2. Pt reporting some soreness in perineum, observed some swelling. Instructed pt she can push her epidural button if feeling some breakthrough pain. Afebrile, 98.8F. Pt requesting some medication to help her sleep, vistaril ordered. Will continue to monitor per protocol, providers will reassess in 4 hours for next miso dose.

## 2020-03-08 NOTE — PLAN OF CARE
The EMR was down for 2.5 hours on 3/8/2020.    The following information was re-entered into the system by Jessica Vaz RN: Flowsheet data, Intake and output, and MAR    Nothing was charted on paper during downtime hours.    Jessica Vaz RN  3/8/2020

## 2020-03-08 NOTE — PLAN OF CARE
Patient coping appropriately.  Now receiving higher dose miso, 100 mcg.  Comfortable with epidural, able to sleep most of shift.  Febrile, 103.0 this evening, now being treated for triple I.  Ampicillin started.   called.  Provider ALEXEY eaton MD will place next miso at midnight.  Report given to KIMBERLY Lopez.

## 2020-03-08 NOTE — PROVIDER NOTIFICATION
03/08/20 0010   Provider Notification   Provider Name/Title Hospital     Method of Notification In Department   Pt requesting to see  to discuss naming ceremony vs. Taoist.  at bedside and providing materials to patient and family.

## 2020-03-08 NOTE — PROVIDER NOTIFICATION
03/08/20 0618   Provider Notification   Provider Name/Title Dr. Wheeler   Method of Notification Phone   per provider, okay for writer to insert miso dose. Also per provider, do not give ibuprofen or tylenol unless pt becomes febrile.

## 2020-03-09 ENCOUNTER — TELEPHONE (OUTPATIENT)
Dept: MATERNAL FETAL MEDICINE | Facility: CLINIC | Age: 25
End: 2020-03-09

## 2020-03-09 LAB
GLUCOSE BLDC GLUCOMTR-MCNC: 162 MG/DL (ref 70–99)
GLUCOSE BLDC GLUCOMTR-MCNC: 69 MG/DL (ref 70–99)
HCV AB SERPL QL IA: NONREACTIVE

## 2020-03-09 PROCEDURE — 25000132 ZZH RX MED GY IP 250 OP 250 PS 637: Performed by: STUDENT IN AN ORGANIZED HEALTH CARE EDUCATION/TRAINING PROGRAM

## 2020-03-09 PROCEDURE — 00000146 ZZHCL STATISTIC GLUCOSE BY METER IP

## 2020-03-09 RX ORDER — AMOXICILLIN 250 MG
1 CAPSULE ORAL DAILY
Qty: 100 TABLET | Refills: 0 | Status: SHIPPED | OUTPATIENT
Start: 2020-03-09 | End: 2020-03-09

## 2020-03-09 RX ORDER — IBUPROFEN 600 MG/1
600 TABLET, FILM COATED ORAL EVERY 6 HOURS PRN
Qty: 60 TABLET | Refills: 0 | Status: SHIPPED | OUTPATIENT
Start: 2020-03-09 | End: 2020-03-09

## 2020-03-09 RX ORDER — ACETAMINOPHEN 325 MG/1
650 TABLET ORAL EVERY 6 HOURS PRN
Qty: 100 TABLET | Refills: 0 | Status: SHIPPED | OUTPATIENT
Start: 2020-03-09 | End: 2020-03-09

## 2020-03-09 RX ADMIN — SENNOSIDES AND DOCUSATE SODIUM 1 TABLET: 8.6; 5 TABLET ORAL at 10:49

## 2020-03-09 RX ADMIN — IBUPROFEN 800 MG: 800 TABLET, FILM COATED ORAL at 10:49

## 2020-03-09 RX ADMIN — ACETAMINOPHEN 650 MG: 325 TABLET, FILM COATED ORAL at 10:48

## 2020-03-09 NOTE — TELEPHONE ENCOUNTER
Received a call from Brooke, genetic counselor at [facility], stating that [facility] laboratory had received a maternal blood specimen for Kelsey. This blood specimen should be have been routed to [facility] (test code 800). Brooke was informed of this desire. Will await callback to determine any other needs from [facility].    Tanisha De La Cruz MS, [facility]  Maternal Fetal Medicine  [facility]  Ph: [phone]  [email or web address]

## 2020-03-09 NOTE — PLAN OF CARE
VSS, afebrile this shift. Pain in right leg from pushing, pain decreased with dose of tylenol. Patient able to eat and drink normally, voiding normally, has not had a bowel movement yet, stool softener given. Fundus firm, U/1, scant bleeding. Using perineal ice pack for discomfort. Mementos given, molds, foot/hand prints, lock of hair, photos, SD card.  Placenta sent to pathology.  Body sent to [facility] with chain of custody form and security at 2113. Patient will update us with choice of cremation or  home tomorrow. Will continue to monitor.

## 2020-03-09 NOTE — PROGRESS NOTES
Attempted to see patient several times. Patient sleeping soundly. Will plan for daytime rounds.     Alicia Myhre,   Obstetrics and Gyncology, PGY-3  March 9, 2020 , 6:38 AM

## 2020-03-09 NOTE — PROGRESS NOTES
Supportive visit with Kelsey and Fausto.  They are quiet and reflective.  Neither openly engaged about Kelsey's labor or Anna's birth. Kelsey's father was present in the room and this may have limited their sharing.  I did gently ask Kelsey about her experience in seeing Anna.  She tearfully responded that it was difficult to see her.      Kelsey and Fausto have not yet made a final decision about a  home to handle Anna's cremation.  Fausto's sister has been in touch with the [facility]  home in [place], [place].  Kelsey is hoping to qualify for burial/financial assistance from [facility] to cover the fees the [facility]  Home is charging.  If she does not qualify for this assistance,  Kelsey and Fausto wish to choose a local  home that will not charge a transportation fee.   Plan made for Kelsey to contact me after discharge when she has made a final decision about the  home.    Provided grief education.  Discussed ways pregnancy and infant loss is unique and different from other losses.  Discussed ways Kelsey and Fausto may grieve differently and strategies to support one another through these differences.       Shared pregnancy and infant loss resources including:  Empty Cradle, Broken Heart:  Surviving the Death of Your Baby  [facility] [place]  Pregnancy and Postpartum Support of [place].     Provided Kelsey with my direct contact information and instructed her to call me when she has chosen the  home.  Also urged her to be in touch if she has additional needs or concerns.

## 2020-03-09 NOTE — PROGRESS NOTES
Diabetes Consult Daily  Progress Note          Assessment/Plan:     Ms. Kelsey Azar is a 25 yo woman,  at 29w6d of pregnancy with fetal demise following cardioplegia for multiple fetal anomalies, type 1 diabetes without complication, who was admitted 3/5/2020 for induction of labor. S/p spontaneous breech vaginal delivery on 3/8/20.    Variable BG in past 24h-- higher BG last night was due to pt underestimated carbs at supper.  Fasting down to 69 this morning, but for now Vandana would like to continue with same dose of Levemir 12 units.    Plan for hospital and home:  - continue detemir to 12 units at bedtime- Vandana will decrease dose to 10 units if tomorrow morning's glucose is still less than 80.  - aspart for meals and snacks: 1unit/12g CHO  - aspart for correction: medium intensity ac and hs   - monitor glucose ac, hs and 0200      Ph#s for IP DM team included in AVS in case pt has questions after discharge.       Outpatient diabetes follow up: with Kendy Luna NP Endocrinology at [facility], within 1-2 weeks of discharge.  Plan discussed with patient and family, bedside RN.           Interval History:     The last 24 hours progress and nursing notes reviewed.  Vandana is feeling ok today.  Glucoses were variable yesterday- initially adequately controlled on 1unit/12g for lunch, but BG then spiked up to mid 200s following supper.  Pt explained that she had Josue Chin last night and guesstimated on carb content- thinks she underestimated.  This morning fasting glucose was not checked until 1030 and at that time was down to 69.  Vandana would like to continue with same dose of Levemir 12 units but agrees to decrease to 10 units if BG is less than 80 tomorrow morning.    Vandana's RN discussed the connection between hyperglycemia and increased risk of pregnancy complications with Vandana this morning.  When I visited, multiple family members were present and pt was busy  "packing clothes.  I addressed that she had the discussion with her RN earlier today and asked if she had any questions for me regarding glucoses and pregnancy, she said 'no'.  Strongly encouraged her to have close follow up with her outpatient endocrinology NP, also commended her on effort to get A1c down during this pregnancy and encouraged her to keep momentum going and keep BG under same tight control, which will benefit her health.      She will discharge to home today with family.      PTA Regimen:  During Pregnancy  Levemir 24 units nightly  Humalog 1 unit per 8 g carbohydrate  Humalog 1 unit per 50 for blood sugar greater than 100  Checking glucose fasting and pre-meal     Pre-pregnancy  Basaglar 12 units nightly  Admelog 1 unit per 12 g carbohydrate  Admelog 1 unit per 50 for blood sugar greater than 100    Recent Labs   Lab 03/09/20  1026 03/09/20  0203 03/08/20  2205 03/08/20  1611 03/08/20  1536 03/08/20  1042   BGM 69* 162* 246* 146* 167* 225*               Review of Systems:   See interval hx          Medications:     Orders Placed This Encounter      Regular Diet Adult      NPO per Anesthesia Guidelines for Procedure/Surgery Except for: Meds      Diet      Physical Exam:  Gen: Alert, up and about room, in NAD.  Multiple family members present.   HEENT: NC/AT, mucous membranes are moist  Resp: Unlabored  Neuro:oriented x3, communicating clearly  /77   Pulse 60   Temp 98.2  F (36.8  C) (Oral)   Resp 16   Ht 1.676 m (5' 6\")   Wt 79.4 kg (175 lb)   LMP  (LMP Unknown)   SpO2 96%   BMI 28.25 kg/m             Data:   No results found for: A1C         No results for input(s): NA, POTASSIUM, CHLORIDE, CO2, ANIONGAP, GLC, BUN, CR, DAMARIS in the last 22995 hours.  CBC RESULTS:   Recent Labs   Lab Test 03/06/20  1934   WBC 13.1*   RBC 4.09   HGB 12.7   HCT 36.8   MCV 90   MCH 31.1   MCHC 34.5   RDW 12.0            Daniela Azar PA-C [phone]  Diabetes Management job code [id number]            "

## 2020-03-09 NOTE — PROGRESS NOTES
"Maternal-Fetal Medicine Attending    Late entry, pt seen several hours ago    S: Some cramping and bleeding, nothing heavy.  Feeling ready to go home.      O:   /77   Pulse 60   Temp 98.2  F (36.8  C) (Oral)   Resp 16   Ht 1.676 m (5' 6\")   Wt 79.4 kg (175 lb) SpO2 96%   BMI 28.25 kg/m    GEN: NAD  ABD: soft, fundus firm below umbilicus, non-tender  EXT: wwp, non-tender    Results for orders placed or performed during the hospital encounter of 20 (from the past 24 hour(s))   Glucose by meter   Result Value Ref Range    Glucose 146 (H) 70 - 99 mg/dL   Glucose by meter   Result Value Ref Range    Glucose 246 (H) 70 - 99 mg/dL   Glucose by meter   Result Value Ref Range    Glucose 162 (H) 70 - 99 mg/dL   Glucose by meter   Result Value Ref Range    Glucose 69 (L) 70 - 99 mg/dL     A/P: 24 year old  PPD#1 s/p  after IOL for fetal demise/fetal anomalies @ 29&4 weeks, doing well.   Meeting milestones for discharge.      - rh positive, rubella immune  - to see endocrinology one more time before discharge, will send home with their insulin recommendations, Vandana to call her endocrinologist for follow-up  - counseled on pelvic rest, to see primary OB in [place] for one week and six week visits  - primary OB Dr. Guzmán's office called, updated Xiomara who will call her for an appointment and remind her to follow-up with endocrinology  - appreciate social work involvement  - we will call her with results of genetic testing currently pending, reviewed limitations of testing (I.e. whole exome not completed) or the possibility that this is diabetes-related    Please see my time attestation on the discharge summary    Date of service (when I saw the patient): 2020      Victoria Morales MD  , OB/GYN  Maternal-Fetal Medicine  [email or web address]  [phone] (Academic office)  [phone] (Pager)       "

## 2020-03-09 NOTE — PLAN OF CARE
VSS, afebrile, resting comfortably in room. Blood sugars treated with sliding scale insulin and carb coverage insulin given for meals and snacks, long acting insulin given per orders.  Denies needs at this time.  Will continue to monitor.

## 2020-03-09 NOTE — PROGRESS NOTES
Pt. discharge to home after delivery IUFD.  She has a very supportive family and significant other. Discussed pp depression and when to RTC. Educated on PP self care. Pt. Verbalizes understanding. All questions and concerns addressed.  Pt. Feels ready to go home

## 2020-03-11 ENCOUNTER — TELEPHONE (OUTPATIENT)
Dept: CARE COORDINATION | Facility: CLINIC | Age: 25
End: 2020-03-11

## 2020-03-11 NOTE — TELEPHONE ENCOUNTER
Phone call to Kelsey yesterday afternoon to check in and to offer support.  No answer but message left on her identified voice mail.     Kelsey called me back today.  She reports that [facility] has approved her burial assistance application and she confirms the [facility]  Home in [place], [place] will handle these arrangements.   She is grateful this is finalized.      SW contacted hospital security to notify them of  home.

## 2020-03-17 ENCOUNTER — TELEPHONE (OUTPATIENT)
Dept: MATERNAL FETAL MEDICINE | Facility: CLINIC | Age: 25
End: 2020-03-17

## 2020-03-17 NOTE — TELEPHONE ENCOUNTER
3/17/2020    Kelsey returned my call and provided an new email address: [email or web address].  This information was added to her demographics page and will be forwarded on to the testing laboratory to facilitate billing for her testing.     Walter Vivas MS, [facility]  Licensed Genetic Counselor  Phone: [phone]  Pager: [phone]

## 2020-03-17 NOTE — TELEPHONE ENCOUNTER
3/17/2020    Called and left a voicemail asking for Kelsey to call genetic counseling.  In order to process reduced cost to patient for her ongoing genetic testing, the lab needs to reach out to her directly.  I asked Kelsey to call and provide an email address that would work for the lab to contact her for this.     Walter Vivas MS, [facility]  Licensed Genetic Counselor  Phone: [phone]  Pager: [phone]

## 2020-03-26 ENCOUNTER — DOCUMENTATION ONLY (OUTPATIENT)
Dept: MATERNAL FETAL MEDICINE | Facility: CLINIC | Age: 25
End: 2020-03-26

## 2020-03-26 NOTE — PROGRESS NOTES
3/26/2020    Call received from [facility] [facility].  Copy Number Variant Analysis failed for Kelsey's ongoing amniocentesis testing.  Sequencing analysis passed  and results are expected in the next several days.  The representative from [employer] offered a copy number retest if we have a backup specimen and we discussed that unfortunately there is no further sample available for testing.     Walter Vivas MS, [facility]  Licensed Genetic Counselor  Phone: [phone]  Pager: [phone]

## 2020-04-01 LAB — COPATH REPORT: NORMAL

## 2020-04-07 ENCOUNTER — TELEPHONE (OUTPATIENT)
Dept: MATERNAL FETAL MEDICINE | Facility: CLINIC | Age: 25
End: 2020-04-07

## 2020-04-08 NOTE — TELEPHONE ENCOUNTER
4/8/2020    Called Kelsey to discuss final genetic test results from her amniocentesis.  The results from the [employer] Holoprosencephaly panel are negative.  This means that for the 17 genes assessed, no variants were detected that would be likely to be pathogenic or disease causing.  We discussed that this result concludes all the genetic testing for her pregnancy, and unfortunately genetics did not identify a specific cause or underlying explanation for the physical differences affecting the pregnancy.  We discussed that without a specific genetic diagnosis, we cannot provide a specific risk estimate for future pregnancies.      Kelsey expressed understanding of these results and had few questions at this time.   We discussed that genetic counseling would be available and recommended if she has any future pregnancies to discuss care recommendations and recurrence risks.  Kelsey had no other questions at this time and was encouraged to remain in contact as needed moving forward     Walter Vivas MS, [facility]  Licensed Genetic Counselor  Phone: [phone]  Pager: [phone]

## 2020-05-05 ENCOUNTER — TELEPHONE (OUTPATIENT)
Dept: MATERNAL FETAL MEDICINE | Facility: CLINIC | Age: 25
End: 2020-05-05

## 2020-05-07 LAB
LOCATION PERFORMED: NORMAL
RESULT: NORMAL
SEND OUTS MISC TEST CODE: 7939
SEND OUTS MISC TEST SPECIMEN: NORMAL
TEST NAME: NORMAL

## 2020-07-24 LAB — MATERNAL CELL CONTAMINATION MOL ANALYSIS: NORMAL

## 2024-09-26 NOTE — PROGRESS NOTES
SPIRITUAL HEALTH SERVICES  SPIRITUAL ASSESSMENT Progress Note   [facility] ([facility]) Labor and Delivery  ON-CALL VISIT    REFERRAL SOURCE: Paged by nurse per patient's request  Paged by nurse to come and meet with Jamari (father of baby). Family members were present in the room - Kelsey's brother and mother, as well as Fausto and his sister. We followed up on a conversation which they had had with last night's on call . Kelsey requested copies of possible blessing services that they might want to have after the baby, Anna, is delivered. I spoke about options of a naming service and a blessing of the senses.     Kelsey identified her concerns from her childhood yeny community with regard to Gnosticist. Offered assurance of God's love and welcome for baby and the whole family.    PLAN: Delivered copies of blessing services for Fausto Cole, and family members to review. I will inform tomorrow's on call  of the request.    Kelsi Elizondo  Chaplain Resident  Pager  [phone]          lerft hip pain radiating to the front of the leg and groin